# Patient Record
Sex: FEMALE | Race: WHITE | NOT HISPANIC OR LATINO | Employment: UNEMPLOYED | URBAN - METROPOLITAN AREA
[De-identification: names, ages, dates, MRNs, and addresses within clinical notes are randomized per-mention and may not be internally consistent; named-entity substitution may affect disease eponyms.]

---
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
## 2022-01-17 NOTE — PROGRESS NOTES
Progress Note    Patient Name: Sanaz Lange  Date: 8/3/2020           Service Type: Individual      Session Start Time: 11:00am  Session End Time: 11:40am     Session Length: 40 mins     Session #: 13    Attendees: Client attended alone    Service Modality:  Video Visit:    Telemedicine Visit: The patient's condition can be safely assessed and treated via synchronous audio and visual telemedicine encounter.      Reason for Telemedicine Visit: Services only offered telehealth    Originating Site (Patient Location): Patient's home    Distant Site (Provider Location): Provider Remote Setting    Consent:  The patient/guardian has verbally consented to: the potential risks and benefits of telemedicine (video visit) versus in person care; bill my insurance or make self-payment for services provided; and responsibility for payment of non-covered services.     Patient would like the video invitation sent by: Send to e-mail at: [email or web address]}     Mode of Communication:  Video Conference via [employer]    As the provider I attest to compliance with applicable laws and regulations related to telemedicine.     Treatment Plan Last Reviewed: 5/29/2020  PHQ-9 / JUANJOSE-7 : P=19 and G=15    DATA  Interactive Complexity: No  Crisis: No       Progress Since Last Session (Related to Symptoms / Goals / Homework):   Symptoms: Worsening Client is facing transition to college and she is very nervous about it and having a lot of unhelpful thoughts related to this. Client stated she is worried she wont make any friends and wont end up liking her major     Homework: Partially completed      Episode of Care Goals: Satisfactory progress - PREPARATION (Decided to change - considering how); Intervened by negotiating a change plan and determining options / strategies for behavior change, identifying triggers, exploring social supports, and working towards setting a date to begin behavior  A Care Transition RN will be following this patient at discharge for 14 days due to a positive COVID-19 diagnosis.     change     Current / Ongoing Stressors and Concerns:   Transition to college aug 20   Relationship issues with mom      Treatment Objective(s) Addressed in This Session:   use cognitive strategies identified in therapy to challenge anxious thoughts  Identify negative self-talk and behaviors: challenge core beliefs, myths, and actions  identify 4 sleep hygiene practices      Client reported having a lot of unhelpful thoughts recently related to her mother and college transition.     Client reported she has had trouble sleeping due to rumination before bed      Intervention:   CBT: Provider assisted client in fact checking unhelpful thoughts and re-framing  Emotion Focused Therapy: Provider guided client in expressing emotions and processing in session         ASSESSMENT: Current Emotional / Mental Status (status of significant symptoms):   Risk status (Self / Other harm or suicidal ideation)   Patient denies current fears or concerns for personal safety.   Patient reports the following current or recent suicidal ideation or behaviors: Client reported she is NOT feeling suicidal or like wanting to hurt herself but rather that she would like to be able to skip this part of life .   Patient denies current or recent homicidal ideation or behaviors.   Patient denies current or recent self injurious behavior or ideation.   Patient denies other safety concerns.   Patient reports there has been no change in risk factors since their last session.     Patient reports there has been no change in protective factors since their last session.     Recommended that patient call 911 or go to the local ED should there be a change in any of these risk factors.     Appearance:   Appropriate    Eye Contact:   Good    Psychomotor Behavior: Normal    Attitude:   Cooperative    Orientation:   All   Speech    Rate / Production: Normal     Volume:  Normal    Mood:    Anxious  Depressed  Normal   Affect:    Appropriate    Thought  Content:  Rumination    Thought Form:  Coherent    Insight:    Fair      Medication Review:   No changes to current psychiatric medication(s)     Medication Compliance:   Yes     Changes in Health Issues:   None reported     Chemical Use Review:   Substance Use: Chemical use reviewed, no active concerns identified      Tobacco Use: No current tobacco use.      Diagnosis:  1. Moderate episode of recurrent major depressive disorder (H)    2. JUANJOSE (generalized anxiety disorder)        Collateral Reports Completed:   Not Applicable    PLAN: (Patient Tasks / Therapist Tasks / Other)  Provider assigned client to use distractor before bed   Provider assigned client to document fact checking unhelpful thoughts a reminder   Provider assigned client to discuss communication expectations with bf       rosendo Mena, [facility] 8/3/2020                                                                                                       ______________________________________________________________________     Treatment Plan     Patient's Name: Sanaz Lange                    YOB: 2000     Date: 5/29/2020        DSM5 Diagnoses: 296.22 (F32.1)  Major Depressive Disorder, Single Episode, Moderate _ or 300.02 (F41.1) Generalized Anxiety Disorder  Psychosocial / Contextual Factors: school, peers, fears, health         Referral / Collaboration:  Referral to another professional/service: ADHD testing     Anticipated number of session or this episode of care: 8-12        MeasurableTreatment Goal(s) related to diagnosis / functional impairment(s)  Goal 1: Patient will score 9 or less on PHQ9 ( currently 13)     I will know I've met my goal when I reach out to friends more (dance team, [employer]), make new friends (at college).Feeling less afraid of being alone in dark/night       Objective #A (Patient Action)                          Patient will Improve quantity and quality of night time sleep  / decrease daytime naps.  Status: New - Date: 1/7/2020      Intervention(s)  Therapist will teach about sleep hygiene and sleep connection to mental and physical wellbeing.      Objective #B  Patient will Identify negative self-talk and behaviors: challenge core beliefs, myths, and actions.  Status: New - Date: 1/7/2020      Intervention(s)  Therapist will utilize CBT to help client recognize thought distortions and teach client coping skills to help combat them. .     Objective #C  Patient will Improve concentration, focus, and mindfulness in daily activities .  Status: New - Date: 1/7/2020      Intervention(s)  Therapist will provide educational materials on tips for concentration as client has family history of ADHD.Provider will refer client to ADHD testing if provider deems necessary due to functioning issues at school and in personal life.         Goal 2: Patient will score 10 or less on GAD7    I will know I've met my goal when I know how to talk to my friends, make new friends, not crying myself to sleep, not ignoring my feelings. Falling asleep faster.       Objective #A (Patient Action)                          Status: New - Date: 1/7/2020      Patient will identify three distraction and diversion activities and use those activities to decrease level of anxiety  .     Intervention(s)  Therapist will educate client on how distraction can help deviate train of unhelpful thoughts.     Objective #B  Patient will use cognitive strategies identified in therapy to challenge anxious thoughts.                      Status: New - Date: 1/7/2020      Intervention(s)  Therapist will teach emotional regulation skills. This will include use of thought labeling, thought record and relaxation strategies.              Patient has reviewed and agreed to the above plan.        Marilyn Ga, [facility]

## 2022-01-18 ENCOUNTER — VIRTUAL VISIT (OUTPATIENT)
Dept: PSYCHOLOGY | Facility: CLINIC | Age: 22
End: 2022-01-18
Payer: COMMERCIAL

## 2022-01-18 DIAGNOSIS — F41.1 GENERALIZED ANXIETY DISORDER: ICD-10-CM

## 2022-01-18 DIAGNOSIS — F33.1 MAJOR DEPRESSIVE DISORDER, RECURRENT EPISODE, MODERATE (H): Primary | ICD-10-CM

## 2022-01-18 DIAGNOSIS — F90.2 ATTENTION DEFICIT HYPERACTIVITY DISORDER, COMBINED TYPE: ICD-10-CM

## 2022-01-18 PROCEDURE — 90834 PSYTX W PT 45 MINUTES: CPT | Mod: 95 | Performed by: COUNSELOR

## 2022-01-18 ASSESSMENT — ANXIETY QUESTIONNAIRES
GAD7 TOTAL SCORE: 13
GAD7 TOTAL SCORE: 13
6. BECOMING EASILY ANNOYED OR IRRITABLE: MORE THAN HALF THE DAYS
7. FEELING AFRAID AS IF SOMETHING AWFUL MIGHT HAPPEN: SEVERAL DAYS
GAD7 TOTAL SCORE: 13
4. TROUBLE RELAXING: MORE THAN HALF THE DAYS
5. BEING SO RESTLESS THAT IT IS HARD TO SIT STILL: NEARLY EVERY DAY
1. FEELING NERVOUS, ANXIOUS, OR ON EDGE: SEVERAL DAYS
7. FEELING AFRAID AS IF SOMETHING AWFUL MIGHT HAPPEN: SEVERAL DAYS
2. NOT BEING ABLE TO STOP OR CONTROL WORRYING: MORE THAN HALF THE DAYS
3. WORRYING TOO MUCH ABOUT DIFFERENT THINGS: MORE THAN HALF THE DAYS

## 2022-01-18 ASSESSMENT — PATIENT HEALTH QUESTIONNAIRE - PHQ9
10. IF YOU CHECKED OFF ANY PROBLEMS, HOW DIFFICULT HAVE THESE PROBLEMS MADE IT FOR YOU TO DO YOUR WORK, TAKE CARE OF THINGS AT HOME, OR GET ALONG WITH OTHER PEOPLE: SOMEWHAT DIFFICULT
SUM OF ALL RESPONSES TO PHQ QUESTIONS 1-9: 10
SUM OF ALL RESPONSES TO PHQ QUESTIONS 1-9: 10

## 2022-01-18 NOTE — PROGRESS NOTES
Progress Note    Patient Name: Sanaz Lange  Date: 1/18/2022           Service Type: Individual      Session Start Time: 3:00pm  Session End Time: 3:43pm     Session Length: 43 mins     Session #: 4    Attendees: Client attended alone    Service Modality:  Video Visit:      Provider verified identity through the following two step process.  Patient provided:  Patient is known previously to provider    Telemedicine Visit: The patient's condition can be safely assessed and treated via synchronous audio and visual telemedicine encounter.      Reason for Telemedicine Visit: Services only offered telehealth    Originating Site (Patient Location): Patient's other house at [facility]     Distant Site (Provider Location): Provider Remote Setting- Home Office    Consent:  The patient/guardian has verbally consented to: the potential risks and benefits of telemedicine (video visit) versus in person care; bill my insurance or make self-payment for services provided; and responsibility for payment of non-covered services.     Patient would like the video invitation sent by:  My Chart    Mode of Communication:  Video Conference via Amwell    As the provider I attest to compliance with applicable laws and regulations related to telemedicine.     Treatment Plan Last Reviewed: July 2021 (1 session since then)   PHQ-9 / JUANJOSE-7 : 10/13- discussed how symptom report does not match scores (scores have no decreased and symptom report is much less than score indicates)     DATA  Interactive Complexity: No  Crisis: No       Progress Since Last Session (Related to Symptoms / Goals / Homework):   Symptoms: Improving emotions and attention reported stable     Homework: Achieved / completed to satisfaction      Episode of Care Goals: Achieved / completed to satisfaction - MAINTENANCE (Working to maintain change, with risk of relapse); Intervened by continuing to positively reinforce healthy  behavior choice      Current / Ongoing Stressors and Concerns:   Familial relationships, long distance relationships      Treatment Objective(s) Addressed in This Session:   Identify negative self-talk and behaviors: challenge core beliefs, myths, and actions  Boundaries      Intervention:   CBT: reframing, opposite belief   Emotion Focused Therapy: supportive listening, emotional support         ASSESSMENT: Current Emotional / Mental Status (status of significant symptoms):   Risk status (Self / Other harm or suicidal ideation)   Patient denies current fears or concerns for personal safety.   Patient denies current or recent suicidal ideation or behaviors.   Patient denies current or recent homicidal ideation or behaviors.   Patient denies current or recent self injurious behavior or ideation.   Patient denies other safety concerns.   Patient reports there has been no change in risk factors since their last session.     Patient reports there has been no change in protective factors since their last session.     A safety and risk management plan has been developed including:   Patient consented to co-developed safety plan.  Safety and risk management plan was completed.  Patient agreed to use safety plan should any safety concerns arise.  A copy was given to the patient.     Appearance:   Appropriate    Eye Contact:   Good    Psychomotor Behavior: Normal    Attitude:   Cooperative    Orientation:   All   Speech    Rate / Production: Normal     Volume:  Normal    Mood:    Normal   Affect:    Appropriate    Thought Content:  Clear    Thought Form:  Coherent  Logical    Insight:    Good  and Fair      Medication Review:   Changes to psychiatric medications, see updated Medication List in EPIC.      Medication Compliance:   Yes     Changes in Health Issues:   None reported     Chemical Use Review:   Substance Use: Chemical use reviewed, no active concerns identified      Tobacco Use: No current tobacco use.       Diagnosis:  1. Major depressive disorder, recurrent episode, moderate (H)    2. Attention deficit hyperactivity disorder, combined type    3. Generalized anxiety disorder        Collateral Reports Completed:   Not Applicable    PLAN: (Patient Tasks / Therapist Tasks / Other)  Provider assigned client to make a list of reminders why she loves college for when she is feeling down when leaving her partner       Review goals-maintanence plan   MANUEL Fox 1/18/2022                                                           ______________________________________________________________________     Treatment Plan     Patient's Name: Sanaz Lange                 YOB: 2000     Date: 7/29/2021        DSM5 Diagnoses:   1. Generalized anxiety disorder    2. Major depressive disorder, recurrent episode, moderate (H)    3. Attention deficit hyperactivity disorder, combined type       Psychosocial / Contextual Factors: college, adhd,         Referral / Collaboration:  Referral to another professional/service is not indicated at this time.     Anticipated number of session or this episode of care: 8        MeasurableTreatment Goal(s) related to diagnosis / functional impairment(s)  Goal 1: Patient will     I will know I've met my goal when I wake up and go places on time (currently 5-10 minutes behind). I feel like I cannot explain how I feel so I would like to be able to do that.             Patient has reviewed and agreed to the above plan.        MANUEL Fox                       July 29, 2021            Answers for HPI/ROS submitted by the patient on 1/18/2022  If you checked off any problems, how difficult have these problems made it for you to do your work, take care of things at home, or get along with other people?: Somewhat difficult  PHQ9 TOTAL SCORE: 10  JUANJOSE 7 TOTAL SCORE: 13

## 2022-01-19 ASSESSMENT — PATIENT HEALTH QUESTIONNAIRE - PHQ9: SUM OF ALL RESPONSES TO PHQ QUESTIONS 1-9: 10

## 2022-01-19 ASSESSMENT — ANXIETY QUESTIONNAIRES: GAD7 TOTAL SCORE: 13

## 2022-02-02 ENCOUNTER — VIRTUAL VISIT (OUTPATIENT)
Dept: PSYCHOLOGY | Facility: CLINIC | Age: 22
End: 2022-02-02
Payer: COMMERCIAL

## 2022-02-02 DIAGNOSIS — F90.2 ATTENTION DEFICIT HYPERACTIVITY DISORDER, COMBINED TYPE: ICD-10-CM

## 2022-02-02 DIAGNOSIS — F41.1 GENERALIZED ANXIETY DISORDER: ICD-10-CM

## 2022-02-02 DIAGNOSIS — F33.1 MAJOR DEPRESSIVE DISORDER, RECURRENT EPISODE, MODERATE (H): Primary | ICD-10-CM

## 2022-02-02 PROCEDURE — 90834 PSYTX W PT 45 MINUTES: CPT | Mod: 95 | Performed by: COUNSELOR

## 2022-02-02 NOTE — PROGRESS NOTES
Progress Note    Patient Name: Sanaz Lange  Date: 2/2/2022           Service Type: Individual      Session Start Time: 7:05am  Session End Time: 4:47am     Session Length: 42 mins     Session #: 5    Attendees: Client attended alone    Service Modality:  Video Visit:      Provider verified identity through the following two step process.  Patient provided:  Patient is known previously to provider    Telemedicine Visit: The patient's condition can be safely assessed and treated via synchronous audio and visual telemedicine encounter.      Reason for Telemedicine Visit: Services only offered telehealth    Originating Site (Patient Location): Patient's home    Distant Site (Provider Location): Provider Remote Setting- Home Office    Consent:  The patient/guardian has verbally consented to: the potential risks and benefits of telemedicine (video visit) versus in person care; bill my insurance or make self-payment for services provided; and responsibility for payment of non-covered services.     Patient would like the video invitation sent by:  My Chart    Mode of Communication:  Video Conference via Amwell    As the provider I attest to compliance with applicable laws and regulations related to telemedicine.         DATA  Interactive Complexity: No  Crisis: No       Progress Since Last Session (Related to Symptoms / Goals / Homework):   Symptoms: Worsening felt very overwhelmed due to school had emotional breakdown     Homework: Partially completed      Episode of Care Goals: Minimal progress - ACTION (Actively working towards change); Intervened by reinforcing change plan / affirming steps taken     Current / Ongoing Stressors and Concerns:   Schedule/credits too much      Treatment Objective(s) Addressed in This Session:   use cognitive strategies identified in therapy to challenge anxious thoughts  Identify negative self-talk and behaviors: challenge core beliefs,  myths, and actions  Improve concentration, focus, and mindfulness in daily activities        Intervention:   Emotion Focused Therapy: supportive listening, validation, emotional processing  Solution Focused: mood documentation  for psych and therapy        ASSESSMENT: Current Emotional / Mental Status (status of significant symptoms):       Risk status (Self / Other harm or suicidal ideation)              Patient denies current fears or concerns for personal safety.              Patient denies current or recent suicidal ideation or behaviors.              Patient denies current or recent homicidal ideation or behaviors.              Patient denies current or recent self injurious behavior or ideation.              Patient denies other safety concerns.              Patient reports there has been no change in risk factors since their last session.                Patient reports there has been no change in protective factors since their last session.                A safety and risk management plan has been developed including:   Patient consented to co-developed safety plan.  Safety and risk management plan was completed.  Patient agreed to use safety plan should any safety concerns arise.  A copy was given to the patient.       Appearance:   Appropriate    Eye Contact:   Good    Psychomotor Behavior: Normal    Attitude:   Cooperative    Orientation:   All   Speech    Rate / Production: Normal     Volume:  Normal    Mood:    Normal   Affect:    Appropriate    Thought Content:  Clear  Rumination    Thought Form:  Coherent  Logical    Insight:    Fair      Medication Review:   No changes to current psychiatric medication(s)     Medication Compliance:   Yes     Changes in Health Issues:   None reported     Chemical Use Review:   Substance Use: Chemical use reviewed, no active concerns identified      Tobacco Use: No current tobacco use.      Diagnosis:  1. Major depressive disorder, recurrent episode, moderate (H)    2.  Attention deficit hyperactivity disorder, combined type    3. Generalized anxiety disorder        Collateral Reports Completed:   Not Applicable    PLAN: (Patient Tasks / Therapist Tasks / Other)  Provider assigned client to journal and document mood       Goals   MANUEL Fox 2/2/2022                                                           ______________________________________________________________________     Treatment Plan     Patient's Name: Sanaz Lange                 YOB: 2000     Date: 7/29/2021        DSM5 Diagnoses:   1. Generalized anxiety disorder    2. Major depressive disorder, recurrent episode, moderate (H)    3. Attention deficit hyperactivity disorder, combined type       Psychosocial / Contextual Factors: college, adhd,         Referral / Collaboration:  Referral to another professional/service is not indicated at this time.     Anticipated number of session or this episode of care: 8        MeasurableTreatment Goal(s) related to diagnosis / functional impairment(s)  Goal 1: Patient will     I will know I've met my goal when I wake up and go places on time (currently 5-10 minutes behind). I feel like I cannot explain how I feel so I would like to be able to do that.             Patient has reviewed and agreed to the above plan.        MANUEL Fox                       July 29, 2021              Answers for HPI/ROS submitted by the patient on 1/18/2022  If you checked off any problems, how difficult have these problems made it for you to do your work, take care of things at home, or get along with other people?: Somewhat difficult  PHQ9 TOTAL SCORE: 10  JUANJOSE 7 TOTAL SCORE: 13

## 2022-03-02 ENCOUNTER — VIRTUAL VISIT (OUTPATIENT)
Dept: PSYCHOLOGY | Facility: CLINIC | Age: 22
End: 2022-03-02
Payer: COMMERCIAL

## 2022-03-02 DIAGNOSIS — F90.2 ATTENTION DEFICIT HYPERACTIVITY DISORDER, COMBINED TYPE: ICD-10-CM

## 2022-03-02 DIAGNOSIS — F33.1 MAJOR DEPRESSIVE DISORDER, RECURRENT EPISODE, MODERATE (H): Primary | ICD-10-CM

## 2022-03-02 DIAGNOSIS — F41.1 GENERALIZED ANXIETY DISORDER: ICD-10-CM

## 2022-03-02 PROCEDURE — 90834 PSYTX W PT 45 MINUTES: CPT | Mod: 95 | Performed by: COUNSELOR

## 2022-03-02 NOTE — PROGRESS NOTES
"    [facility]                                     Progress Note    Patient Name: Sanaz Lange  Date: 3/2/2022           Service Type: Individual      Session Start Time: 1:03pm  Session End Time: 1:42pm     Session Length: 39 mins     Session #: 6    Attendees: Client attended alone    Service Modality:  Video Visit:      Provider verified identity through the following two step process.  Patient provided:  Patient is known previously to provider    Telemedicine Visit: The patient's condition can be safely assessed and treated via synchronous audio and visual telemedicine encounter.      Reason for Telemedicine Visit: Services only offered telehealth    Originating Site (Patient Location): Patient's home    Distant Site (Provider Location): Provider Remote Setting- Home Office    Consent:  The patient/guardian has verbally consented to: the potential risks and benefits of telemedicine (video visit) versus in person care; bill my insurance or make self-payment for services provided; and responsibility for payment of non-covered services.     Patient would like the video invitation sent by:  My Chart    Mode of Communication:  Video Conference via Amwell    As the provider I attest to compliance with applicable laws and regulations related to telemedicine.    DATA  Interactive Complexity: No  Crisis: No        Progress Since Last Session (Related to Symptoms / Goals / Homework):   Symptoms: Worsening client reported not taking mental health medications and her mood being off, \"feeling like I am falling apart\"    Homework: Did not complete      Episode of Care Goals: Minimal progress - ACTION (Actively working towards change); Intervened by reinforcing change plan / affirming steps taken     Current / Ongoing Stressors and Concerns:   Remembering medication   Reading assignments   Keeping up with online class      Treatment Objective(s) Addressed in This Session:   Discuss motivation / " ambivalence about taking anti-depressant / mood stabilizer medication(s)       Intervention:   Emotion Focused Therapy: emotional processing, supportive listening  Solution Focused: plan for med compliance    Assessments completed prior to visit:  The following assessments were completed by patient for this visit:  PROMIS 10-Global Health (only subscores and total score):   PROMIS-10 Scores Only 1/18/2022   Global Mental Health Score 12   Global Physical Health Score 17   PROMIS TOTAL - SUBSCORES 29         ASSESSMENT: Current Emotional / Mental Status (status of significant symptoms):   Risk status (Self / Other harm or suicidal ideation)   Patient denies current fears or concerns for personal safety.   Patient denies current or recent suicidal ideation or behaviors.   Patient denies current or recent homicidal ideation or behaviors.   Patient denies current or recent self injurious behavior or ideation.   Patient denies other safety concerns.   Patient reports there has been no change in risk factors since their last session.     Patient reports there has been no change in protective factors since their last session.     A safety and risk management plan has been developed including: Patient has no change in safety concerns. Committed to safety and agreed to follow previously developed safety plan..  Patient consented to co-developed safety plan.  Safety and risk management plan was completed.  Patient agreed to use safety plan should any safety concerns arise.  A copy was given to the patient.     Appearance:   Appropriate    Eye Contact:   Good    Psychomotor Behavior: Normal    Attitude:   Cooperative  Interested   Orientation:   All   Speech    Rate / Production: Emotional    Volume:  Normal    Mood:    Anxious  Depressed    Affect:    Tearful   Thought Content:  Rumination    Thought Form:  Coherent    Insight:    Fair      Medication Review:   No changes to current psychiatric medication(s)     Medication  Compliance:   No client reports taking meds only 2 days a week      Changes in Health Issues:   None reported     Chemical Use Review:   Substance Use: Chemical use reviewed, no active concerns identified      Tobacco Use: No current tobacco use.      Diagnosis:  1. Major depressive disorder, recurrent episode, moderate (H)    2. Attention deficit hyperactivity disorder, combined type    3. Generalized anxiety disorder        Collateral Reports Completed:   Not Applicable    PLAN: (Patient Tasks / Therapist Tasks / Other)  Provider assigned client to make psych appt for med eval  Provider assigned client to use reminders to help with med intake       goals  MANUEL Fox 3/2/2022                                                           ______________________________________________________________________    Individual Treatment Plan    Patient's Name: Sanaz Lange  YOB: 2000    Date of Creation: 3/2/2022    Date Treatment Plan Last Reviewed/Revised: 3/2/2022      DSM5 Diagnoses:   1. Major depressive disorder, recurrent episode, moderate (H)    2. Attention deficit hyperactivity disorder, combined type    3. Generalized anxiety disorder      Psychosocial / Contextual Factors: reading, emotional regulation  PROMIS (reviewed every 90 days): 29    Referral / Collaboration:  Referral to another professional/service is not indicated at this time..    Anticipated number of session for this episode of care: 4 (this provider going on leave)  Anticipation frequency of session: Every other week  Anticipated Duration of each session: 38-52 minutes  Treatment plan will be reviewed in 90 days or when goals have been changed.       MeasurableTreatment Goal(s) related to diagnosis / functional impairment(s)  Goal 1: Patient will take medications 100% of the time     I will know I've met my goal when .          MANUEL Fox  March 2, 2022  Answers for HPI/ROS submitted by the patient on  3/2/2022  If you checked off any problems, how difficult have these problems made it for you to do your work, take care of things at home, or get along with other people?: Somewhat difficult  PHQ9 TOTAL SCORE: 10

## 2022-03-03 ASSESSMENT — PATIENT HEALTH QUESTIONNAIRE - PHQ9: SUM OF ALL RESPONSES TO PHQ QUESTIONS 1-9: 10

## 2022-03-05 ENCOUNTER — HEALTH MAINTENANCE LETTER (OUTPATIENT)
Age: 22
End: 2022-03-05

## 2022-03-08 NOTE — PROGRESS NOTES
"[facility] behavioral health [facility]  March 9, 2022      Behavioral Health Clinician Progress Note    Patient Name: Sanaz Lange           Service Type:  Individual      Service Location:   [employer] / Email (patient reached)     Session Start Time: 1100am  Session End Time: 1116am      Session Length: 16 - 37      Attendees: Patient    Visit Activities (Refresh list every visit): [facility] Only    Diagnostic Assessment Date: 10/09/2021  Treatment Plan Review Date: 10/09/2022  See Flowsheets for today's PHQ-9 and JUANJOSE-7 results  Previous PHQ-9:   PHQ-9 SCORE 10/27/2021 1/18/2022 3/2/2022   PHQ-9 Total Score MyChart - 10 (Moderate depression) 10 (Moderate depression)   PHQ-9 Total Score 12 10 10   PHQ-A Total Score - - -     Previous JUANJOSE-7:   JUANJOSE-7 SCORE 10/7/2021 10/27/2021 1/18/2022   Total Score 16 (severe anxiety) - 13 (moderate anxiety)   Total Score 16 12 13       DEYSI LEVEL:  No flowsheet data found.    DATA  Extended Session (60+ minutes): No  Interactive Complexity: No  Crisis: No  [facility] Patient: No    Treatment Objective(s) Addressed in This Session:  Target Behavior(s): improve med compliance    Depressed Mood: Decrease frequency and intensity of feeling down, depressed, hopeless    Current Stressors / Issues:  MH update: Sanaz reports that attention has not improved no matter what strategies she's tried. Anxiety and depression have been manageable as long as she takes meds (states that she had forgotten a few doses and noticed an increase in her symptoms) Depression and anxiety getting worse: feelings of helplessness, sad, academic underperformance, low self worth.   Stresses: No  Appetite: unremarkable  Sleep: unremarkable  Therapy:  Yes with [facility]  EVE: Occassionally drinks etoh  Preg: No  Interventions: explored ways to maintain med compliance by using phone reminder, CBT to challenge cognitive distortions (\"not good enough\")  Most important: attention    Progress on Treatment " Objective(s) / Homework:  Satisfactory progress - ACTION (Actively working towards change); Intervened by reinforcing change plan / affirming steps taken    Motivational Interviewing    MI Intervention: Co-Developed Goal: maintain med compliance, Expressed Empathy/Understanding, Open-ended questions, Reflections: simple and complex and Change talk (evoked)     Change Talk Expressed by the Patient: Desire to change Ability to change Reasons to change Need to change Committment to change Activation Taking steps    Provider Response to Change Talk: E - Evoked more info from patient about behavior change, A - Affirmed patient's thoughts, decisions, or attempts at behavior change, R - Reflected patient's change talk and S - Summarized patient's change talk statements      Care Plan review completed: Yes    Medication Review:  No changes to current psychiatric medication(s)    Medication Compliance:  Yes    Changes in Health Issues:   None reported    Chemical Use Review:   Substance Use: Chemical use reviewed, no active concerns identified      Tobacco Use: No current tobacco use.      Assessment: Current Emotional / Mental Status (status of significant symptoms):  Risk status (Self / Other harm or suicidal ideation)  Patient denies a history of suicidal ideation, suicide attempts, self-injurious behavior, homicidal ideation, homicidal behavior and and other safety concerns  Patient denies current fears or concerns for personal safety.  Patient denies current or recent suicidal ideation or behaviors.  Patient denies current or recent homicidal ideation or behaviors.  Patient denies current or recent self injurious behavior or ideation.  Patient denies other safety concerns.  A safety and risk management plan has not been developed at this time, however patient was encouraged to call [facility] should there be a change in any of these risk factors.    Appearance:   Appropriate   Eye Contact:   Good   Psychomotor  Behavior: Normal   Attitude:   Cooperative   Orientation:   All  Speech   Rate / Production: Normal    Volume:  Normal   Mood:    Depressed   Affect:    Appropriate   Thought Content:  Clear   Thought Form:  Coherent  Logical   Insight:    Good     Diagnoses:  1. Depression, major, recurrent, mild (H)    2. Attention deficit hyperactivity disorder (ADHD), combined type    3. JUANJOSE (generalized anxiety disorder)        Collateral Reports Completed:  Collaborated with Usha Hobbs, MSN, APRN, CNP, [facility], [facility]    Plan: (Homework, other):  Patient was given information about behavioral services and encouraged to schedule a follow up appointment with the clinic [facility] in 1 month.  She was also given information about mental health symptoms and treatment options .  CD Recommendations: No indications of CD issues.  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    Anticipated number of session for this episode of care: 4-6  Anticipation frequency of session: Monthly  Anticipated Duration of each session: 16-37 minutes  Treatment plan will be reviewed in 90 days or when goals have been changed.       MeasurableTreatment Goal(s) related to diagnosis / functional impairment(s)  Goal 1: Patient will experience  improved depression    I will know I've met my goal when I have less feelings of helplessness and hopelessness.      Objective #A (Patient Action)    Patient will Decrease frequency and intensity of feeling down, depressed, hopeless.  Status: Continued - Date(s): 06/08/2022    Intervention(s)  Therapist will teach strategies to challenge destructive self talk.    Patient has reviewed and agreed to the above plan.      Saida Rodriguez, [facility]  March 9, 2022

## 2022-03-09 ENCOUNTER — VIRTUAL VISIT (OUTPATIENT)
Dept: BEHAVIORAL HEALTH | Facility: CLINIC | Age: 22
End: 2022-03-09
Payer: COMMERCIAL

## 2022-03-09 ENCOUNTER — VIRTUAL VISIT (OUTPATIENT)
Dept: PSYCHIATRY | Facility: CLINIC | Age: 22
End: 2022-03-09
Payer: COMMERCIAL

## 2022-03-09 DIAGNOSIS — F32.0 MILD MAJOR DEPRESSION (H): ICD-10-CM

## 2022-03-09 DIAGNOSIS — F41.1 GAD (GENERALIZED ANXIETY DISORDER): ICD-10-CM

## 2022-03-09 DIAGNOSIS — F33.0 DEPRESSION, MAJOR, RECURRENT, MILD (H): Primary | ICD-10-CM

## 2022-03-09 DIAGNOSIS — F41.9 ANXIETY: ICD-10-CM

## 2022-03-09 DIAGNOSIS — F98.8 ATTENTION DEFICIT DISORDER, UNSPECIFIED HYPERACTIVITY PRESENCE: Primary | ICD-10-CM

## 2022-03-09 DIAGNOSIS — F90.2 ATTENTION DEFICIT HYPERACTIVITY DISORDER (ADHD), COMBINED TYPE: ICD-10-CM

## 2022-03-09 PROCEDURE — 90832 PSYTX W PT 30 MINUTES: CPT | Mod: 95 | Performed by: SOCIAL WORKER

## 2022-03-09 PROCEDURE — 99214 OFFICE O/P EST MOD 30 MIN: CPT | Mod: 95 | Performed by: NURSE PRACTITIONER

## 2022-03-09 RX ORDER — VENLAFAXINE HYDROCHLORIDE 150 MG/1
150 CAPSULE, EXTENDED RELEASE ORAL DAILY
Qty: 90 CAPSULE | Refills: 0 | Status: SHIPPED | OUTPATIENT
Start: 2022-03-09 | End: 2022-05-10

## 2022-03-09 RX ORDER — METHYLPHENIDATE HYDROCHLORIDE 20 MG/1
20 TABLET ORAL 2 TIMES DAILY
Qty: 60 TABLET | Refills: 0 | Status: SHIPPED | OUTPATIENT
Start: 2022-03-09 | End: 2022-05-10

## 2022-03-09 RX ORDER — LAMOTRIGINE 25 MG/1
25 TABLET ORAL DAILY
Qty: 180 TABLET | Refills: 1 | Status: SHIPPED | OUTPATIENT
Start: 2022-03-09 | End: 2022-04-01

## 2022-03-09 RX ORDER — HYDROXYZINE HYDROCHLORIDE 25 MG/1
25 TABLET, FILM COATED ORAL 2 TIMES DAILY PRN
Qty: 30 TABLET | Refills: 0 | Status: SHIPPED | OUTPATIENT
Start: 2022-03-09 | End: 2023-01-11

## 2022-03-09 NOTE — PROGRESS NOTES
PSYCHIATRIC MEDICATION FOLLOW UP APPT     Name:  Sanaz Lange  : 2000    Sanaz Lange is a 21 year old female who is being evaluated via a billable video visit.      How would you like to obtain your AVS? MyChart  If the video visit is dropped, the invitation should be resent by: Text to cell phone: [phone]  Will anyone else be joining your video visit? No    [place] If not [place] address below, please ask where they are in case of an emergency situation arises during the appointment.  [id number] [facility] [address]    Telemedicine Visit: The patient's condition can be safely assessed and treated via synchronous audio and visual telemedicine encounter.      Reason for Telemedicine Visit: COVID 19 pandemic and the social and physical recommendations by the CDC and MDH.      Originating Site (Patient Location): Patient's home    Distant Site (Provider Location): Provider Remote Setting    Consent:  The patient/guardian has verbally consented to: the potential risks and benefits of telemedicine (video visit or phone) versus in person care; bill my insurance or make self-payment for services provided; and responsibility for payment of non-covered services.     Mode of Communication:  GOWEX video platform     As the provider I attest to compliance with applicable laws and regulations related to telemedicine.    IDENTIFICATION   Referred by: Blessing Wadsworth PA-C [facility]   Therapist: Marilyn with [facility]     Patient attended the phone/video session alone.    Last seen for outpatient psychiatry Return Visit on 2021.      FOLLOWING PLAN PUT INTO PLACE: No improvement in depression and anxiety with the Venlafaxine increase to 187.5 mg. Will decrease back to the 150 mg and augment with lamotrigine titrated to 50 mg daily targeting residual depression. Follow-up two months       INTERIM HISTORY      COMMUNICATIONS FROM PATIENT VIA:  none    RECORDS AVAILABLE FOR REVIEW: EHR records through [employer] .  In addition, reviewed the assessment completed by Saida Rodriguez [facility], dated today       HISTORY OF PRESENT ILLNESS   [facility]PS referral for psychiatric medication consult in 10/07/2021.  Reports history of depression and ADHD.  Denies prior psychiatric hospitalizations. No history of suicidal thoughts or attempts. No history of self-injurious behaviors. Genetically loaded for  anxiety, depression and ADHD.. Grew up in an intact home with all basic needs being met.     Reports past diagnosis of depression and ADHD. First sought treatment in around 2018 in the context of depression.  Felt sad all the time, emotional, felt she was letting people down, couldn't focus during class.  She was started with therapy and anitdepressants.  Subsequently diagnosis with ADHD and when looking back at elementary, dyllan and high school years endorses long standing distractibility, restlessness, poor concentration, inattention, forgetfulness, need for repetition of instructions, and poor listening.       FAMILY, MEDICAL, SURGICAL HISTORY REVIEWED.  MEDICATION HAVE BEEN REVIEWED AND ARE CURRENT TO THE BEST OF MY KNOWLEDGE AND ABILITY.  Student at [employer], Sophomore  Living with a roommate  Working part time as a       MEDICATIONS                                                                                                Current Outpatient Medications   Medication Sig     Cetirizine HCl (ZYRTEC PO)      cholecalciferol (VITAMIN D3) 125 MCG (5000 UT) TABS tablet Take 1 tablet (5,000 Units) by mouth daily     hydrOXYzine (ATARAX) 25 MG tablet Take 1 tablet (25 mg) by mouth 2 times daily as needed for anxiety (or panic)     lamoTRIgine (LAMICTAL) 25 MG tablet Take 1 tablet (25 mg) by mouth daily take 25 mg daily for two weeks then increase to 50 mg daily until follow-up appointment in four weeks.      "methylphenidate (RITALIN LA) 20 MG 24 hr capsule Take 20 mg by mouth daily     methylphenidate (RITALIN LA) 20 MG 24 hr capsule Take 20 mg by mouth daily     norgestim-eth estrad triphasic (TRI-LO-SPRINTEC) 0.18/0.215/0.25 MG-25 MCG tablet Take 1 tablet by mouth daily     venlafaxine (EFFEXOR-XR) 150 MG 24 hr capsule Take 1 capsule (150 mg) by mouth daily     venlafaxine (EFFEXOR-XR) 37.5 MG 24 hr capsule Take 1 capsule (37.5 mg) by mouth daily Take with 150 mg for total daily dose of 187.5 mg     No current facility-administered medications for this visit.        NOTES ABOUT CURRENT PSYCHOTROPIC MEDICATIONS:   Ritalin 20 LA mg   Venlafaxine  mg, no benefit from increase to 187.5 mg in 11/2021  Hydroxyzine 25 mg twice a day as needed anxiety, taking it 2 times at school  Lamotrigine 50 mg       Supplements: vitamin D. Omega Fish oil/omega-3, MV     PAST PSYCHOTROPIC MEDICATIONS:  Fluoxetine   Citalopram       TODAY PATIENT REPORTS THE FOLLOWING PSYCHIATRIC ROS:   Per [facility], Saida Rodriguez, during today's team-based visit:  \" update: Sanaz reports that attention has not improved no matter what strategies she's tried. Anxiety and depression have been manageable as long as she takes meds (states that she had forgotten a few doses and noticed an increase in her symptoms) Depression and anxiety getting worse: feelings of helplessness, sad, academic underperformance, low self worth.  Stresses: No  Appetite: unremarkable  Sleep: unremarkable  Therapy: Yes with Marilyn Ga [facility]  EVE: Occassionally drinks etoh  Preg: No  Interventions: explored ways to maintain med compliance by using phone reminder, CBT to challenge cognitive distortions (\"not good enough\")  Most important: attention\"     EXERCISE: Adequate  SIDE EFFECTS:   tolerating medications without reported side effects  SUBSTANCE USE:  Denies   COMPLIANCE:   states Adherent to medication regimen  REPORTS THE FOLLOWING NEW MEDICAL ISSUES:   " none    PROBLEM: DEPRESSION: Feels the current medication regimen is effective.    Suicidal ideation: Denies       Last PHQ-9 3/2/2022   1.  Little interest or pleasure in doing things 2   2.  Feeling down, depressed, or hopeless 2   3.  Trouble falling or staying asleep, or sleeping too much 1   4.  Feeling tired or having little energy 1   5.  Poor appetite or overeating 1   6.  Feeling bad about yourself 1   7.  Trouble concentrating 2   8.  Moving slowly or restless 0   Q9: Thoughts of better off dead/self-harm past 2 weeks 0   PHQ-9 Total Score 10   Difficulty at work, home, or with people -   In the past two weeks have you had thoughts of suicide or self harm? -   Do you have concerns about your personal safety or the safety of others? -     PHQ-9 SCORE 10/27/2021 1/18/2022 3/2/2022   PHQ-9 Total Score MyChart - 10 (Moderate depression) 10 (Moderate depression)   PHQ-9 Total Score 12 10 10   PHQ-A Total Score - - -     PHQ9 score is Not completed today  Suicidal ideation:  No     PROBLEM: ANXIETY: Feels the current medication regimen is effective..   GAD7 score is Not completed today  JUANJOSE-7 SCORE 10/7/2021 10/27/2021 1/18/2022   Total Score 16 (severe anxiety) - 13 (moderate anxiety)   Total Score 16 12 13       PROBLEM: ADHD: Worsening.  Currently on Spring Break from classes.  Had to drop one class due to getting behind.  She only needs the methylphenidate for studying and classes.  Sometimes it is too late in the day to take it.      PERTINENT PAST MEDICAL AND SURGICAL HISTORY     Past Medical History:   Diagnosis Date     Hypovitaminosis D 9/19/2019     Other specified viral warts 4/2/2015       VITALS     BP Readings from Last 1 Encounters:   07/07/21 110/70     Pulse Readings from Last 1 Encounters:   07/07/21 100     Wt Readings from Last 1 Encounters:   07/07/21 78 kg (172 lb) (93 %, Z= 1.46)*     * Growth percentiles are based on CDC (Girls, 2-20 Years) data.     Ht Readings from Last 1 Encounters:  "  07/07/21 1.638 m (5' 4.5\") (53 %, Z= 0.09)*     * Growth percentiles are based on [facility] (Girls, 2-20 Years) data.     Estimated body mass index is 29.07 kg/m  as calculated from the following:    Height as of 7/7/21: 1.638 m (5' 4.5\").    Weight as of 7/7/21: 78 kg (172 lb).    LABS & IMAGING                                                                                                                   Recent Labs   Lab Test 03/18/21  1532 06/04/19  1134   WBC 7.3 6.9   HGB 12.6 11.8   HCT 38.5 36.2   MCV 90 84    281   ANEU  --  3.7     Recent Labs   Lab Test 03/18/21  1532      POTASSIUM 4.2   CHLORIDE 107   CO2 27   *   RENUKA 9.3   BUN 9   CR 0.80   GFRESTIMATED >90   ALBUMIN 3.9   PROTTOTAL 8.1   AST 24   ALT 28   ALKPHOS 49   BILITOTAL 0.1*     No lab results found.  Recent Labs   Lab Test 07/07/21  0919   TSH 1.37   T4 0.86        ALLERGY & IMMUNIZATIONS     No Known Allergies    MEDICAL REVIEW OF SYSTEMS:   Ten system review was completed with pertinent positives noted     MENTAL STATUS EXAM:   General/Constitutional:  Appearance:   awake, alert, adequately groomed, appeared stated age and no apparent distress  Attitude:    cooperative   Eye Contact:  good  Musculoskeletal:  Psychomotor Behavior:  no evidence of tardive dyskinesia, dystonia, or tics from the head up  Psychiatric:  Speech:  clear, coherent, regular rate, rhythm, and volume,   No pressure speech noted.  Associations:  no loose associations  Thought Process:   logical, linear and goal oriented  Thought Content:    No evidence of suicidal ideation or homicidal ideation, no evidence of psychotic thought, no auditory hallucinations present and no visual hallucinations present  Mood:  improving   Affect:  full range/stable (normal variation of emotions during exam) and was congruent to speech content.  Insight:  good  Judgment:  intact, adequate for safety  Impulse Control:  intact  Neurological:  Oriented to:  person, place, " time, and situation  Attention Span and Concentration:  normal    Language: intact     Recent and Remote Memory:  Intact to interview. Not formally assessed. No amnesia.    Fund of Knowledge: appropriate        SAFETY   Feels safe in home: Yes   Suicidal ideation: Denies  History of suicide attempts:  No   Hx of impulsivity: No   Hope for the future: present    Hx of Command hallucinations or current psychosis: No  History of Self-injurious behaviors: No Current:  No  Family member  by suicide:  No     SAFETY ASSESSMENT:   Based on all available evidence including the factors cited above, overall Risk for harm is low and is appropriate for outpatient level of care.   Recommended that patient call 911 or go to the local ED should there be a change in any of these risk factors.    DSM 5 DIAGNOSIS:   Attention-Deficit/Hyperactivity Disorder  314.00 (F90.0) Predominantly inattentive presentation  296.31 (F33.0) Major Depressive Disorder, Recurrent Episode, Mild _    MEDICAL COMORBIDITY IMPACTING CLINICAL PICTURE: None noted  ASSESSMENT AND PLAN      Problem List Items Addressed This Visit        Behavioral     Mood is essentially stable on current medication regimen.  Primary symptom is executive functioning impairment (inability to focus, impaired organization and planning, reduced working memory). The methylphenidate LA 20 mg is not as effective as it has been.  In addition, she only needs for studying and classes.  Therefore will transition to methylphenidate IR and increase to 20 mg twice a day or she can just time taking one tablet if she has a 4 hour window for studying and not take the second tablet.  Continue remaining medications at current dosages. Follow-up two months          Attention deficit disorder, unspecified hyperactivity presence - Primary    Relevant Medications    methylphenidate (RITALIN) 20 MG tablet    Mild major depression (H)    Relevant Medications    venlafaxine (EFFEXOR-XR) 150 MG 24 hr  capsule    hydrOXYzine (ATARAX) 25 MG tablet    lamoTRIgine (LAMICTAL) 25 MG tablet       Other    Anxiety    Relevant Medications    venlafaxine (EFFEXOR-XR) 150 MG 24 hr capsule    hydrOXYzine (ATARAX) 25 MG tablet    lamoTRIgine (LAMICTAL) 25 MG tablet           CONSULTS/REFERRALS:   Continue therapy   Coordinate care with therapist as needed     MEDICAL:   None at this time  Coordinate care with PCP (Blessing Wadsworth) as needed  Follow up with primary care provider as planned or for acute medical concerns.     PSYCHOEDUCATION:  Medication side effects and alternatives reviewed. Health promotion activities recommended and reviewed today. All questions addressed. Education and counseling completed regarding risks and benefits of medications and psychotherapy options.  Consent provided by patient/guardian  Call the psychiatric nurse line with medication questions or concerns at [phone].  VILOOPhart may be used to communicate with your provider, but this is not intended to be used for emergencies.  BLACK BOX WARNING: Discussed the Food and Drug Administration (FDA) requires that all antidepressants carry a warning that some children, adolescents and young adults may be at increased risk of suicide when taking antidepressants. Anyone taking an antidepressant should be watched closely for worsening depression or unusual behavior especially in the first few weeks after starting an SSRI. Keep in mind, antidepressants are more likely to reduce suicide risk in the long run by improving mood.   STIMULANT THERAPY: Side effects discussed including but not limited to cardiac (including HTN, tachycardia, sudden death), motor/tic, appetite/growth, mood lability and sleep disruption. This is a controlled substance with risk for abuse, need to keep in a safe keep place and cannot replace lost scripts  Medlineplus.gov is information for patients.  It is run by the [employer] and it contains information about  all disorders, diseases and all medications.       COMMUNITY RESOURCES:    CRISIS NUMBERS: Provided in AVS 10/7/2021  National Suicide Prevention Lifeline: 7-809-843-TALK (666-858-5682)  AisleFinder/resources for a list of additional resources (SOS)            [facility] - [phone]   Urgent Care Adult Mental Rtfkvw-[phone] mobile unit/ 24/7 crisis line  [facility] -[phone]   COPE 24/7 [facility] Mobile Team -[phone] (adults)/ [phone] (child)  Poison Control Center - 1-556.869.7689    OR  go to nearest ER  Crisis Text Line for any crisis 24/7 send this-   To: 537946   [facility] ([facility]) [facility]  [phone]  National Suicide Prevention Lifeline: 868.813.5427 (TTY: 103.879.1521). Call anytime for help.  (www.suicidepreventionlifeline.org)  National [place] Mental Illness (www.sallie.org): 583-123-4186 or 445-646-9964.   Mental Health Association (www.mentalhealth.org): 710.982.4573 or 384-117-0701.  [place] Crisis Text Line: Text [place] to [address]0001  Suicide LifeLine Chat: suicideTapestryline.org/chat      ADMINISTRATIVE BILLING:     Time spent interviewing patient, reviewing referral documents, obtaining and reviewing outside records, communication with other health specialists, and preparing this report.    Video/Phone Start Time:  11:15 AM  Video/Phone End Time:  1132    Greater than 50% of time was spent in counseling and coordination of care regarding above diagnoses and treatment plan.    Patient Status:  Patient will continue to be seen for ongoing consultation and stabilization.    Signed:   Usha Hobbs, MSN, APRN, FMHNP-[facility] ([facility])   Chart documentation done in part with Dragon Voice Recognition software.  Although reviewed after completion, some word and grammatical errors may remain.

## 2022-03-24 ENCOUNTER — VIRTUAL VISIT (OUTPATIENT)
Dept: PSYCHOLOGY | Facility: CLINIC | Age: 22
End: 2022-03-24
Payer: COMMERCIAL

## 2022-03-24 DIAGNOSIS — F41.1 GENERALIZED ANXIETY DISORDER: ICD-10-CM

## 2022-03-24 DIAGNOSIS — F90.2 ATTENTION DEFICIT HYPERACTIVITY DISORDER, COMBINED TYPE: ICD-10-CM

## 2022-03-24 DIAGNOSIS — F33.1 MAJOR DEPRESSIVE DISORDER, RECURRENT EPISODE, MODERATE (H): Primary | ICD-10-CM

## 2022-03-24 PROCEDURE — 90834 PSYTX W PT 45 MINUTES: CPT | Mod: 95 | Performed by: COUNSELOR

## 2022-03-24 NOTE — PROGRESS NOTES
"[facility]                                     Progress Note    Patient Name: Sanaz Lange  Date: 3/24/2022           Service Type: Individual      Session Start Time: 9:01am  Session End Time: 9:40am     Session Length: 39 mins    Session #: 7    Attendees: Client attended alone    Service Modality:  Video Visit:      Provider verified identity through the following two step process.  Patient provided:  Patient is known previously to provider    Telemedicine Visit: The patient's condition can be safely assessed and treated via synchronous audio and visual telemedicine encounter.      Reason for Telemedicine Visit: Services only offered telehealth    Originating Site (Patient Location): Patient's home    Distant Site (Provider Location): Provider Remote Setting- Home Office    Consent:  The patient/guardian has verbally consented to: the potential risks and benefits of telemedicine (video visit) versus in person care; bill my insurance or make self-payment for services provided; and responsibility for payment of non-covered services.     Patient would like the video invitation sent by:  My Chart    Mode of Communication:  Video Conference via Amwell    As the provider I attest to compliance with applicable laws and regulations related to telemedicine.    DATA  Interactive Complexity: No  Crisis: No        Progress Since Last Session (Related to Symptoms / Goals / Homework):   Symptoms: Improving consistent with medications and feeling \"much better\"    Homework: Achieved / completed to satisfaction      Episode of Care Goals: Satisfactory progress - ACTION (Actively working towards change); Intervened by reinforcing change plan / affirming steps taken     Current / Ongoing Stressors and Concerns:   Online class   Summer job application    Spending summer at home (parents argue)     Treatment Objective(s) Addressed in This Session:   managing time/planning for online class   Boundaries " with parents and mom      Intervention:   Emotion Focused Therapy: supportive listening, emotional processing   Interpersonal Therapy: boundaries with family of origin   Solution Focused: planning for online class work     Assessments completed prior to visit:  The following assessments were completed by patient for this visit:  PHQ9:   PHQ-9 SCORE 7/7/2021 8/5/2021 9/21/2021 10/7/2021 10/27/2021 1/18/2022 3/2/2022   PHQ-9 Total Score MyChart 11 (Moderate depression) - - 13 (Moderate depression) - 10 (Moderate depression) 10 (Moderate depression)   PHQ-9 Total Score 11 16 14 13 12 10 10   PHQ-A Total Score - - - - - - -     GAD7:   JUANJOSE-7 SCORE 3/18/2021 7/7/2021 8/5/2021 9/21/2021 10/7/2021 10/27/2021 1/18/2022   Total Score - 15 (severe anxiety) - - 16 (severe anxiety) - 13 (moderate anxiety)   Total Score 11 15 14 13 16 12 13     PROMIS 10-Global Health (only subscores and total score):   PROMIS-10 Scores Only 1/18/2022   Global Mental Health Score 12   Global Physical Health Score 17   PROMIS TOTAL - SUBSCORES 29         ASSESSMENT: Current Emotional / Mental Status (status of significant symptoms):   Risk status (Self / Other harm or suicidal ideation)   Patient denies current fears or concerns for personal safety.   Patient denies current or recent suicidal ideation or behaviors.   Patient denies current or recent homicidal ideation or behaviors.   Patient denies current or recent self injurious behavior or ideation.   Patient denies other safety concerns.   Patient reports there has been no change in risk factors since their last session.     Patient reports there has been a chance in protective factors since their last session.  med changes and made med schedule    A safety and risk management plan has been developed including: Patient has no change in safety concerns. Committed to safety and agreed to follow previously developed safety plan..  Patient consented to co-developed safety plan.  Safety and risk  management plan was completed.  Patient agreed to use safety plan should any safety concerns arise.  A copy was given to the patient.     Appearance:   Appropriate    Eye Contact:   Good    Psychomotor Behavior: Normal    Attitude:   Cooperative    Orientation:   All   Speech    Rate / Production: Normal     Volume:  Normal    Mood:    Normal   Affect:    Appropriate    Thought Content:  Clear    Thought Form:  Coherent  Logical    Insight:    Good  and Fair      Medication Review:   Changes to psychiatric medications, see updated Medication List in EPIC.      Medication Compliance:   Yes     Changes in Health Issues:   None reported     Chemical Use Review:   Substance Use: Chemical use reviewed, no active concerns identified      Tobacco Use: No current tobacco use.      Diagnosis:  1. Major depressive disorder, recurrent episode, moderate (H)    2. Attention deficit hyperactivity disorder, combined type    3. Generalized anxiety disorder        Collateral Reports Completed:   Not Applicable    PLAN: (Patient Tasks / Therapist Tasks / Other)  Provider assigned client to look into online class syllabus to plan   Provider assigned client to fill out summer work application         Marilyn Ga [facility] 3/24/2022                                                            ______________________________________________________________________     Individual Treatment Plan     Patient's Name: Sanaz Lange                 YOB: 2000     Date of Creation: 3/2/2022     Date Treatment Plan Last Reviewed/Revised: 3/2/2022        DSM5 Diagnoses:   1. Major depressive disorder, recurrent episode, moderate (H)    2. Attention deficit hyperactivity disorder, combined type    3. Generalized anxiety disorder       Psychosocial / Contextual Factors: reading, emotional regulation  PROMIS (reviewed every 90 days): 29     Referral / Collaboration:  Referral to another professional/service is not indicated at this  time..     Anticipated number of session for this episode of care: 4 (this provider going on leave)  Anticipation frequency of session: Every other week  Anticipated Duration of each session: 38-52 minutes  Treatment plan will be reviewed in 90 days or when goals have been changed.         MeasurableTreatment Goal(s) related to diagnosis / functional impairment(s)  Goal 1: Patient will take medications 100% of the time     I will know I've met my goal when .             Marilyn Ga, [facility]                       March 2, 2022

## 2022-03-28 NOTE — ASSESSMENT & PLAN NOTE
Mood is essentially stable on current medication regimen.  Primary symptom is executive functioning impairment (inability to focus, impaired organization and planning, reduced working memory). The methylphenidate LA 20 mg is not as effective as it has been.  In addition, she only needs for studying and classes.  Therefore will transition to methylphenidate IR and increase to 20 mg twice a day or she can just time taking one tablet if she has a 4 hour window for studying and not take the second tablet.  Continue remaining medications at current dosages. Follow-up two months

## 2022-03-31 ENCOUNTER — TELEPHONE (OUTPATIENT)
Dept: BEHAVIORAL HEALTH | Facility: CLINIC | Age: 22
End: 2022-03-31
Payer: COMMERCIAL

## 2022-03-31 DIAGNOSIS — F32.0 MILD MAJOR DEPRESSION (H): ICD-10-CM

## 2022-03-31 NOTE — TELEPHONE ENCOUNTER
[facility] pharmacy called. They and the patient believe the lamotrigine prescription was sent in wrong. It was sent as 1 per day and they believe it should be 2 per day. Call Pharmacy at: [phone]. Callers name was Poppy.

## 2022-04-01 RX ORDER — LAMOTRIGINE 25 MG/1
50 TABLET ORAL DAILY
Qty: 180 TABLET | Refills: 1 | Status: SHIPPED | OUTPATIENT
Start: 2022-04-01 | End: 2023-01-11

## 2022-04-01 RX ORDER — LAMOTRIGINE 25 MG/1
25 TABLET ORAL DAILY
Qty: 180 TABLET | Refills: 1 | Status: SHIPPED | OUTPATIENT
Start: 2022-04-01 | End: 2022-04-01

## 2022-04-14 ENCOUNTER — VIRTUAL VISIT (OUTPATIENT)
Dept: PSYCHOLOGY | Facility: CLINIC | Age: 22
End: 2022-04-14
Payer: COMMERCIAL

## 2022-04-14 DIAGNOSIS — F41.1 GENERALIZED ANXIETY DISORDER: ICD-10-CM

## 2022-04-14 DIAGNOSIS — F33.1 MAJOR DEPRESSIVE DISORDER, RECURRENT EPISODE, MODERATE (H): Primary | ICD-10-CM

## 2022-04-14 DIAGNOSIS — F90.2 ATTENTION DEFICIT HYPERACTIVITY DISORDER, COMBINED TYPE: ICD-10-CM

## 2022-04-14 PROCEDURE — 90834 PSYTX W PT 45 MINUTES: CPT | Mod: 95 | Performed by: COUNSELOR

## 2022-04-14 ASSESSMENT — ANXIETY QUESTIONNAIRES
6. BECOMING EASILY ANNOYED OR IRRITABLE: SEVERAL DAYS
GAD7 TOTAL SCORE: 9
3. WORRYING TOO MUCH ABOUT DIFFERENT THINGS: SEVERAL DAYS
7. FEELING AFRAID AS IF SOMETHING AWFUL MIGHT HAPPEN: SEVERAL DAYS
1. FEELING NERVOUS, ANXIOUS, OR ON EDGE: SEVERAL DAYS
2. NOT BEING ABLE TO STOP OR CONTROL WORRYING: SEVERAL DAYS
5. BEING SO RESTLESS THAT IT IS HARD TO SIT STILL: NEARLY EVERY DAY

## 2022-04-14 ASSESSMENT — PATIENT HEALTH QUESTIONNAIRE - PHQ9
5. POOR APPETITE OR OVEREATING: SEVERAL DAYS
SUM OF ALL RESPONSES TO PHQ QUESTIONS 1-9: 9

## 2022-04-14 NOTE — PROGRESS NOTES
[facility]                                     Progress Note    Patient Name: Sanaz Lange  Date: 4/14/2022           Service Type: Individual      Session Start Time: 9:00am  Session End Time: 9:40am     Session Length: 40 mins     Session #: 8    Attendees: Client attended alone    Service Modality:  Video Visit:      Provider verified identity through the following two step process.  Patient provided:  Patient is known previously to provider    Telemedicine Visit: The patient's condition can be safely assessed and treated via synchronous audio and visual telemedicine encounter.      Reason for Telemedicine Visit: Services only offered telehealth    Originating Site (Patient Location): Patient's home    Distant Site (Provider Location): Provider Remote Setting- Home Office    Consent:  The patient/guardian has verbally consented to: the potential risks and benefits of telemedicine (video visit) versus in person care; bill my insurance or make self-payment for services provided; and responsibility for payment of non-covered services.     Patient would like the video invitation sent by:  My Chart    Mode of Communication:  Video Conference via Amwell    As the provider I attest to compliance with applicable laws and regulations related to telemedicine.    DATA  Interactive Complexity: No  Crisis: No        Progress Since Last Session (Related to Symptoms / Goals / Homework):   Symptoms: Improving      Homework: Achieved / completed to satisfaction      Episode of Care Goals: Satisfactory progress - ACTION (Actively working towards change); Intervened by reinforcing change plan / affirming steps taken     Current / Ongoing Stressors and Concerns:   Emotional regulation   Sleep (trouble waking even with adequate sleep time)   Finals upcoming    Moving home for the summer     Treatment Objective(s) Addressed in This Session:   update tx goals   Resources for LD testing   Sleep concerns       Intervention:   Emotion Focused Therapy: supportive listening, affirmations, emotional processing  Solution Focused: sleep study    Assessments completed prior to visit:  The following assessments were completed by patient for this visit:  PHQ9:   PHQ-9 SCORE 8/5/2021 9/21/2021 10/7/2021 10/27/2021 1/18/2022 3/2/2022 4/14/2022   PHQ-9 Total Score MyChart - - 13 (Moderate depression) - 10 (Moderate depression) 10 (Moderate depression) -   PHQ-9 Total Score 16 14 13 12 10 10 9   PHQ-A Total Score - - - - - - -     GAD7:   JUANJOSE-7 SCORE 7/7/2021 8/5/2021 9/21/2021 10/7/2021 10/27/2021 1/18/2022 4/14/2022   Total Score 15 (severe anxiety) - - 16 (severe anxiety) - 13 (moderate anxiety) -   Total Score 15 14 13 16 12 13 9         ASSESSMENT: Current Emotional / Mental Status (status of significant symptoms):   Risk status (Self / Other harm or suicidal ideation)   Patient denies current fears or concerns for personal safety.   Patient denies current or recent suicidal ideation or behaviors.   Patient denies current or recent homicidal ideation or behaviors.   Patient denies current or recent self injurious behavior or ideation.   Patient denies other safety concerns.   Patient reports there has been no change in risk factors since their last session.     Patient reports there has been no change in protective factors since their last session.     A safety and risk management plan has been developed including: Patient has no change in safety concerns. Committed to safety and agreed to follow previously developed safety plan..  Patient consented to co-developed safety plan.  Safety and risk management plan was completed.  Patient agreed to use safety plan should any safety concerns arise.  A copy was given to the patient.     Appearance:   Appropriate    Eye Contact:   Good    Psychomotor Behavior: Normal    Attitude:   Cooperative    Orientation:   All   Speech    Rate / Production: Normal     Volume:  Normal     Mood:    Normal   Affect:    Appropriate    Thought Content:  Clear    Thought Form:  Coherent  Logical    Insight:    Good  and Fair      Medication Review:   No changes to current psychiatric medication(s)     Medication Compliance:   Yes     Changes in Health Issues:   Yes: headaches, Associated Psychological Distress     Chemical Use Review:   Substance Use: Chemical use reviewed, no active concerns identified      Tobacco Use: No current tobacco use.      Diagnosis:  1. Major depressive disorder, recurrent episode, moderate (H)    2. Attention deficit hyperactivity disorder, combined type    3. Generalized anxiety disorder        Collateral Reports Completed:   Not Applicable    PLAN: (Patient Tasks / Therapist Tasks / Other)  Provider assigned client to look into sleep study and access services for LD testing  Provider assigned client to consider any added tx goals         Marilyn Ga, [facility] 4/14/2022                                                           ______________________________________________________________________     Individual Treatment Plan     Patient's Name: Sanaz Lange                 YOB: 2000     Date of Creation: 3/2/2022     Date Treatment Plan Last Reviewed/Revised: 3/2/2022        DSM5 Diagnoses:   1. Major depressive disorder, recurrent episode, moderate (H)    2. Attention deficit hyperactivity disorder, combined type    3. Generalized anxiety disorder       Psychosocial / Contextual Factors: reading, emotional regulation  PROMIS (reviewed every 90 days): 29     Referral / Collaboration:  Referral to another professional/service is not indicated at this time..     Anticipated number of session for this episode of care: 4 (this provider going on leave)  Anticipation frequency of session: Every other week  Anticipated Duration of each session: 38-52 minutes  Treatment plan will be reviewed in 90 days or when goals have been changed.          MeasurableTreatment Goal(s) related to diagnosis / functional impairment(s)  Goal 1: Patient will take medications 100% of the time     I will know I've met my goal when I will know I've met my goal when I wake up and go places on time (currently 5-10 minutes behind).       Marilyn Ga, [facility]  April 14, 2022   /OB/GYN

## 2022-04-15 ASSESSMENT — ANXIETY QUESTIONNAIRES: GAD7 TOTAL SCORE: 9

## 2022-04-28 ENCOUNTER — VIRTUAL VISIT (OUTPATIENT)
Dept: PSYCHOLOGY | Facility: CLINIC | Age: 22
End: 2022-04-28
Payer: COMMERCIAL

## 2022-04-28 DIAGNOSIS — F33.1 MAJOR DEPRESSIVE DISORDER, RECURRENT EPISODE, MODERATE (H): Primary | ICD-10-CM

## 2022-04-28 DIAGNOSIS — F41.1 GENERALIZED ANXIETY DISORDER: ICD-10-CM

## 2022-04-28 DIAGNOSIS — F90.2 ATTENTION DEFICIT HYPERACTIVITY DISORDER, COMBINED TYPE: ICD-10-CM

## 2022-04-28 PROCEDURE — 90834 PSYTX W PT 45 MINUTES: CPT | Mod: 95 | Performed by: COUNSELOR

## 2022-04-28 NOTE — PROGRESS NOTES
[facility]                                     Progress Note    Patient Name: Sanaz Lange  Date: 4/28/2022         Service Type: Individual      Session Start Time: 9:00am  Session End Time: 9:41am     Session Length: 41 mins     Session #: 9    Attendees: Client attended alone    Service Modality:  Video Visit:      Provider verified identity through the following two step process.  Patient provided:  Patient is known previously to provider    Telemedicine Visit: The patient's condition can be safely assessed and treated via synchronous audio and visual telemedicine encounter.      Reason for Telemedicine Visit: Services only offered telehealth    Originating Site (Patient Location): Patient's home    Distant Site (Provider Location): Provider Remote Setting- Home Office    Consent:  The patient/guardian has verbally consented to: the potential risks and benefits of telemedicine (video visit) versus in person care; bill my insurance or make self-payment for services provided; and responsibility for payment of non-covered services.     Patient would like the video invitation sent by:  My Chart    Mode of Communication:  Video Conference via Amwell    As the provider I attest to compliance with applicable laws and regulations related to telemedicine.    DATA  Interactive Complexity: No  Crisis: No        Progress Since Last Session (Related to Symptoms / Goals / Homework):   Symptoms: Improving stable    Homework: Partially completed      Episode of Care Goals: Satisfactory progress - ACTION (Actively working towards change); Intervened by reinforcing change plan / affirming steps taken     Current / Ongoing Stressors and Concerns:   Roommate relationships   Finishing online class   Trouble staying on task      Treatment Objective(s) Addressed in This Session:   Use weekly communication check ins with roommate           Intervention:   Emotion Focused Therapy: supportive listening  emotional processing  Interpersonal Therapy: communication     Assessments completed prior to visit:  The following assessments were completed by patient for this visit:  PHQ9:   PHQ-9 SCORE 8/5/2021 9/21/2021 10/7/2021 10/27/2021 1/18/2022 3/2/2022 4/14/2022   PHQ-9 Total Score MyChart - - 13 (Moderate depression) - 10 (Moderate depression) 10 (Moderate depression) -   PHQ-9 Total Score 16 14 13 12 10 10 9   PHQ-A Total Score - - - - - - -     GAD7:   JUANJOSE-7 SCORE 7/7/2021 8/5/2021 9/21/2021 10/7/2021 10/27/2021 1/18/2022 4/14/2022   Total Score 15 (severe anxiety) - - 16 (severe anxiety) - 13 (moderate anxiety) -   Total Score 15 14 13 16 12 13 9         ASSESSMENT: Current Emotional / Mental Status (status of significant symptoms):   Risk status (Self / Other harm or suicidal ideation)   Patient denies current fears or concerns for personal safety.   Patient denies current or recent suicidal ideation or behaviors.   Patient denies current or recent homicidal ideation or behaviors.   Patient denies current or recent self injurious behavior or ideation.   Patient denies other safety concerns.   Patient reports there has been no change in risk factors since their last session.     Patient reports there has been no change in protective factors since their last session.     A safety and risk management plan has been developed including: Patient has no change in safety concerns. Committed to safety and agreed to follow previously developed safety plan..  Patient consented to co-developed safety plan.  Safety and risk management plan was completed.  Patient agreed to use safety plan should any safety concerns arise.  A copy was given to the patient.     Appearance:   Appropriate    Eye Contact:   Good    Psychomotor Behavior: Normal    Attitude:   Cooperative    Orientation:   All   Speech    Rate / Production: Normal     Volume:  Normal    Mood:    Normal   Affect:    Appropriate    Thought Content:  Clear    Thought  Form:  Coherent  Logical    Insight:    Fair      Medication Review:   No changes to current psychiatric medication(s)     Medication Compliance:   Yes     Changes in Health Issues:   None reported     Chemical Use Review:   Substance Use: Chemical use reviewed, no active concerns identified      Tobacco Use: No current tobacco use.      Diagnosis:  1. Major depressive disorder, recurrent episode, moderate (H)    2. Attention deficit hyperactivity disorder, combined type    3. Generalized anxiety disorder        Collateral Reports Completed:   Not Applicable    PLAN: (Patient Tasks / Therapist Tasks / Other)  Provider assigned client to return [facility] transfer call to schedule with provider while this provider is out  Provider assigned client to contact [facility] for testing         Marilyn Ga [facility] 4/28/2022                                                             ______________________________________________________________________     Individual Treatment Plan     Patient's Name: Sanaz Lange                 YOB: 2000     Date of Creation: 3/2/2022     Date Treatment Plan Last Reviewed/Revised: 3/2/2022        DSM5 Diagnoses:   1. Major depressive disorder, recurrent episode, moderate (H)    2. Attention deficit hyperactivity disorder, combined type    3. Generalized anxiety disorder       Psychosocial / Contextual Factors: reading, emotional regulation  PROMIS (reviewed every 90 days): 29     Referral / Collaboration:  Referral to another professional/service is not indicated at this time..     Anticipated number of session for this episode of care: 4 (this provider going on leave)  Anticipation frequency of session: Every other week  Anticipated Duration of each session: 38-52 minutes  Treatment plan will be reviewed in 90 days or when goals have been changed.         MeasurableTreatment Goal(s) related to diagnosis / functional impairment(s)  Goal 1: Patient will take medications  "100% of the time     I will know I've met my goal when I will know I've met my goal when I wake up and go places on time (currently 5-10 minutes behind).I will be following the schedule in my planner (keep up with work), working on online classes on a regular basis.\"        Marilyn Ga, [facility]                       April 14, 2022      "

## 2022-05-04 NOTE — PROGRESS NOTES
[facility] behavioral health [facility]  May 10, 2022      Behavioral Health Clinician Progress Note    Patient Name: Sanaz Lange           Service Type:  Individual      Service Location:   [place] / Email (patient reached)     Session Start Time: 1000am  Session End Time: 1018am      Session Length: 16 - 37      Attendees: Patient    Visit Activities (Refresh list every visit): [facility] Only    Diagnostic Assessment Date: 10/09/2021  Treatment Plan Review Date: 6/09/2022  See Flowsheets for today's PHQ-9 and JUANJOSE-7 results  Previous PHQ-9:   PHQ-9 SCORE 1/18/2022 3/2/2022 4/14/2022   PHQ-9 Total Score [place] 10 (Moderate depression) 10 (Moderate depression) -   PHQ-9 Total Score 10 10 9   PHQ-A Total Score - - -     Previous JUANJOSE-7:   JUANJOSE-7 SCORE 10/27/2021 1/18/2022 4/14/2022   Total Score - 13 (moderate anxiety) -   Total Score 12 13 9       DEYSI LEVEL:  No flowsheet data found.    DATA  Extended Session (60+ minutes): No  Interactive Complexity: No  Crisis: No  [facility] Patient: No    Treatment Objective(s) Addressed in This Session:  Target Behavior(s): improve med compliance    Depressed Mood: Decrease frequency and intensity of feeling down, depressed, hopeless    Current Stressors / Issues:  MH update: Pt reports still no improvement with regard to attention. Depression is improved. Feels like her moods are more stable. She hasn't noticed any change in attention as her mood has improved. Explored how her thoughts may influence or interact with her depression. She hasn't noticed any so [facility] suggested homework to review past depression episodes in order to uncover any cognitive distortions that may influence her mood. Denies any other patterns (weather etc).  Stresses: home from college, new job  Appetite: unremarkable  Sleep: unremarkable  Therapy: Marilyn Ga with [facility]  EVE: No  Preg: No  Interventions: skills to manage depression such as decreasing isolation, music, pt given homework to monitor  thoughts and how they influence her mood  Most important: ongoing attention issues      Progress on Treatment Objective(s) / Homework:  Satisfactory progress - ACTION (Actively working towards change); Intervened by reinforcing change plan / affirming steps taken    Motivational Interviewing    MI Intervention: Co-Developed Goal: maintain med compliance, Expressed Empathy/Understanding, Open-ended questions, Reflections: simple and complex and Change talk (evoked)     Change Talk Expressed by the Patient: Desire to change Ability to change Reasons to change Need to change Committment to change Activation Taking steps    Provider Response to Change Talk: E - Evoked more info from patient about behavior change, A - Affirmed patient's thoughts, decisions, or attempts at behavior change, R - Reflected patient's change talk and S - Summarized patient's change talk statements      Care Plan review completed: Yes    Medication Review:  No changes to current psychiatric medication(s)    Medication Compliance:  Yes    Changes in Health Issues:   None reported    Chemical Use Review:   Substance Use: Chemical use reviewed, no active concerns identified      Tobacco Use: No current tobacco use.      Assessment: Current Emotional / Mental Status (status of significant symptoms):  Risk status (Self / Other harm or suicidal ideation)  Patient denies a history of suicidal ideation, suicide attempts, self-injurious behavior, homicidal ideation, homicidal behavior and and other safety concerns  Patient denies current fears or concerns for personal safety.  Patient denies current or recent suicidal ideation or behaviors.  Patient denies current or recent homicidal ideation or behaviors.  Patient denies current or recent self injurious behavior or ideation.  Patient denies other safety concerns.  A safety and risk management plan has not been developed at this time, however patient was encouraged to call [facility] should  there be a change in any of these risk factors.    Appearance:   Appropriate   Eye Contact:   Good   Psychomotor Behavior: Normal   Attitude:   Cooperative   Orientation:   All  Speech   Rate / Production: Normal    Volume:  Normal   Mood:    Depressed   Affect:    Appropriate   Thought Content:  Clear   Thought Form:  Coherent  Logical   Insight:    Good     Diagnoses:  1. Depression, major, recurrent, mild (H)        Collateral Reports Completed:  Collaborated with Usha Hobbs, MSN, APRN, CNP, FMHNP-BC, [facility]    Plan: (Homework, other):  Patient was given information about behavioral services and encouraged to schedule a follow up appointment with the clinic [facility] in 1 month.  She was also given information about mental health symptoms and treatment options .  CD Recommendations: No indications of CD issues.  Saida Rodriguez MSW [facility]      ______________________________________________________________________    Integrated Primary Care Behavioral Health Treatment Plan    Patient's Name: Sanaz Lange  YOB: 2000    Date of Creation: 03/09/2022  Date Treatment Plan Last Reviewed/Revised: NA    DSM5 Diagnoses: Attention-Deficit/Hyperactivity Disorder  314.00 (F90.0) Predominantly inattentive presentation, 296.32 (F33.1) Major Depressive Disorder, Recurrent Episode, Moderate _ and With anxious distress or 300.02 (F41.1) Generalized Anxiety Disorder  Psychosocial / Contextual Factors: none identified  PROMIS (reviewed every 90 days): 29    Referral / Collaboration:  Referral to another professional/service is not indicated at this time..    Anticipated number of session for this episode of care: 4-6  Anticipation frequency of session: Monthly  Anticipated Duration of each session: 16-37 minutes  Treatment plan will be reviewed in 90 days or when goals have been changed.       MeasurableTreatment Goal(s) related to diagnosis / functional impairment(s)  Goal 1: Patient will  experience improved depression    I will know I've met my goal when I have less feelings of helplessness and hopelessness.      Objective #A (Patient Action)    Patient will Decrease frequency and intensity of feeling down, depressed, hopeless.  Status: Continued - Date(s): 06/08/2022    Intervention(s)  Therapist will teach strategies to challenge destructive self talk.    Patient has reviewed and agreed to the above plan.      Saida Rodriguez, [facility]  May 10, 2022

## 2022-05-10 ENCOUNTER — VIRTUAL VISIT (OUTPATIENT)
Dept: PSYCHIATRY | Facility: CLINIC | Age: 22
End: 2022-05-10
Payer: COMMERCIAL

## 2022-05-10 ENCOUNTER — VIRTUAL VISIT (OUTPATIENT)
Dept: BEHAVIORAL HEALTH | Facility: CLINIC | Age: 22
End: 2022-05-10
Payer: COMMERCIAL

## 2022-05-10 DIAGNOSIS — F98.8 ATTENTION DEFICIT DISORDER, UNSPECIFIED HYPERACTIVITY PRESENCE: Primary | ICD-10-CM

## 2022-05-10 DIAGNOSIS — F32.0 MILD MAJOR DEPRESSION (H): ICD-10-CM

## 2022-05-10 DIAGNOSIS — F33.0 DEPRESSION, MAJOR, RECURRENT, MILD (H): Primary | ICD-10-CM

## 2022-05-10 DIAGNOSIS — F41.9 ANXIETY: ICD-10-CM

## 2022-05-10 PROCEDURE — 99214 OFFICE O/P EST MOD 30 MIN: CPT | Mod: 95 | Performed by: NURSE PRACTITIONER

## 2022-05-10 PROCEDURE — 90832 PSYTX W PT 30 MINUTES: CPT | Mod: 95 | Performed by: SOCIAL WORKER

## 2022-05-10 RX ORDER — DEXTROAMPHETAMINE SACCHARATE, AMPHETAMINE ASPARTATE MONOHYDRATE, DEXTROAMPHETAMINE SULFATE AND AMPHETAMINE SULFATE 5; 5; 5; 5 MG/1; MG/1; MG/1; MG/1
20 CAPSULE, EXTENDED RELEASE ORAL DAILY
Qty: 14 CAPSULE | Refills: 0 | Status: SHIPPED | OUTPATIENT
Start: 2022-05-10 | End: 2022-06-01

## 2022-05-10 RX ORDER — VENLAFAXINE HYDROCHLORIDE 150 MG/1
150 CAPSULE, EXTENDED RELEASE ORAL DAILY
Qty: 90 CAPSULE | Refills: 0 | Status: SHIPPED | OUTPATIENT
Start: 2022-05-10 | End: 2022-06-28

## 2022-05-10 NOTE — PROGRESS NOTES
PSYCHIATRIC MEDICATION FOLLOW UP APPT     Name:  Sanaz Lange  : 2000    Sanaz Lange is a 21 year old female who is being evaluated via a billable video visit.      How would you like to obtain your AVS? MyChart  If the video visit is dropped, the invitation should be resent by: Text to cell phone: [phone]  Will anyone else be joining your video visit? No        [place] If not [place] address below, please ask where they are in case of an emergency situation arises during the appointment.  [address] [facility] [address]    Telemedicine Visit: The patient's condition can be safely assessed and treated via synchronous audio and visual telemedicine encounter.      Reason for Telemedicine Visit: COVID 19 pandemic and the social and physical recommendations by the CDC and MD.      Originating Site (Patient Location): Patient's home    Distant Site (Provider Location): Provider Remote Setting    Consent:  The patient/guardian has verbally consented to: the potential risks and benefits of telemedicine (video visit or phone) versus in person care; bill my insurance or make self-payment for services provided; and responsibility for payment of non-covered services.     Mode of Communication:  PodPonics video platform     As the provider I attest to compliance with applicable laws and regulations related to telemedicine.    IDENTIFICATION   Referred by: Blessing Wadsworth PA-C [facility]   Therapist: Marilyn with [facility]     Patient attended the phone/video session alone.    Last seen for outpatient psychiatry Return Visit on 3/9/2022.      FOLLOWING PLAN PUT INTO PLACE: Mood is essentially stable on current medication regimen.  Primary symptom is executive functioning impairment (inability to focus, impaired organization and planning, reduced working memory). The methylphenidate LA 20 mg is not as effective as it has  been.  In addition, she only needs for studying and classes.  Therefore will transition to methylphenidate IR and increase to 20 mg twice a day or she can just time taking one tablet if she has a 4 hour window for studying and not take the second tablet.  Continue remaining medications at current dosages. Follow-up two months        INTERIM HISTORY     COMMUNICATIONS FROM PATIENT VIA:  none    RECORDS AVAILABLE FOR REVIEW: EHR records through [facility] .  In addition, reviewed the assessment completed by Saida Rodriguez [facility], dated today       HISTORY OF PRESENT ILLNESS   CCPS referral for psychiatric medication consult in 10/07/2021.  Reports history of depression and ADHD.  Denies prior psychiatric hospitalizations. No history of suicidal thoughts or attempts. No history of self-injurious behaviors. Genetically loaded for  anxiety, depression and ADHD.. Grew up in an intact home with all basic needs being met.     Reports past diagnosis of depression and ADHD. First sought treatment in around 2018 in the context of depression.  Felt sad all the time, emotional, felt she was letting people down, couldn't focus during class.  She was started with therapy and anitdepressants.  Subsequently diagnosis with ADHD and when looking back at elementary, dyllan and high school years endorses long standing distractibility, restlessness, poor concentration, inattention, forgetfulness, need for repetition of instructions, and poor listening.       FAMILY, MEDICAL, SURGICAL HISTORY REVIEWED.  MEDICATION HAVE BEEN REVIEWED AND ARE CURRENT TO THE BEST OF MY KNOWLEDGE AND ABILITY.  Student at [employer] in fall 2022  Living with a roommate  Will start a job at a  for the summer    MEDICATIONS                                                                                                Current Outpatient Medications   Medication Sig     Cetirizine HCl (ZYRTEC PO)      cholecalciferol (VITAMIN D3) 125 MCG (5000 UT)  "TABS tablet Take 1 tablet (5,000 Units) by mouth daily     hydrOXYzine (ATARAX) 25 MG tablet Take 1 tablet (25 mg) by mouth 2 times daily as needed for anxiety (or panic)     lamoTRIgine (LAMICTAL) 25 MG tablet Take 2 tablets (50 mg) by mouth daily     methylphenidate (RITALIN) 20 MG tablet Take 1 tablet (20 mg) by mouth 2 times daily     norgestim-eth estrad triphasic (TRI-LO-SPRINTEC) 0.18/0.215/0.25 MG-25 MCG tablet Take 1 tablet by mouth daily     venlafaxine (EFFEXOR-XR) 150 MG 24 hr capsule Take 1 capsule (150 mg) by mouth daily     No current facility-administered medications for this visit.        NOTES ABOUT CURRENT PSYCHOTROPIC MEDICATIONS:   Ritalin 20 IR 20 mg twice a day   Venlafaxine  mg, no benefit from increase to 187.5 mg in 11/2021  Hydroxyzine 25 mg twice a day as needed anxiety, taking it 2 times at school  Lamotrigine 50 mg       Supplements: vitamin D. Omega Fish oil/omega-3, MV     PAST PSYCHOTROPIC MEDICATIONS:  Fluoxetine   Citalopram   Methylphenidate LA 20 mg not effective      TODAY PATIENT REPORTS THE FOLLOWING PSYCHIATRIC ROS:   Per [facility], Saida Rodriguez, during today's team-based visit:  \" update: Pt reports still no improvement with regard to attention. Depression is improved. Feels like her moods are more stable. She hasn't noticed any change in attention as her mood has improved. Explored how her thoughts may influence or interact with her depression. She hasn't noticed any so [facility] suggested homework to review past depression episodes in order to uncover any cognitive distortions that may influence her mood. Denies any other patterns (weather etc).  Stresses: home from college, new job  Appetite: unremarkable  Sleep: unremarkable  Therapy: Marilyn Ga with [facility]  EVE: No  Preg: No  Interventions: skills to manage depression such as decreasing isolation, music, pt given homework to monitor thoughts and how they influence her mood  Most important: ongoing attention issues\" "     EXERCISE: Adequate  SIDE EFFECTS:   tolerating medications without reported side effects  SUBSTANCE USE:  Denies   COMPLIANCE:   states Adherent to medication regimen  REPORTS THE FOLLOWING NEW MEDICAL ISSUES:   none    PROBLEM: DEPRESSION: Feels the current medication regimen is effective.    Suicidal ideation: Denies       Last PHQ-9 4/14/2022   1.  Little interest or pleasure in doing things 2   2.  Feeling down, depressed, or hopeless 1   3.  Trouble falling or staying asleep, or sleeping too much 1   4.  Feeling tired or having little energy 1   5.  Poor appetite or overeating 1   6.  Feeling bad about yourself 1   7.  Trouble concentrating 2   8.  Moving slowly or restless 0   Q9: Thoughts of better off dead/self-harm past 2 weeks 0   PHQ-9 Total Score 9   Difficulty at work, home, or with people -   In the past two weeks have you had thoughts of suicide or self harm? -   Do you have concerns about your personal safety or the safety of others? -     PHQ-9 SCORE 1/18/2022 3/2/2022 4/14/2022   PHQ-9 Total Score MyChart 10 (Moderate depression) 10 (Moderate depression) -   PHQ-9 Total Score 10 10 9   PHQ-A Total Score - - -     PHQ9 score is Not completed today  Suicidal ideation:  No     PROBLEM: ANXIETY: Feels the current medication regimen is effective..   GAD7 score is Not completed today  JUANJOSE-7 SCORE 10/27/2021 1/18/2022 4/14/2022   Total Score - 13 (moderate anxiety) -   Total Score 12 13 9       PROBLEM: ADHD: No change.  The increase in ritalin to 40 mg total daily dose using IR 20 mg twice a day has not had any positive improvement in attention and focus    PERTINENT PAST MEDICAL AND SURGICAL HISTORY     Past Medical History:   Diagnosis Date     Hypovitaminosis D 9/19/2019     Other specified viral warts 4/2/2015       VITALS     BP Readings from Last 1 Encounters:   07/07/21 110/70     Pulse Readings from Last 1 Encounters:   07/07/21 100     Wt Readings from Last 1 Encounters:   07/07/21 78 kg  "(172 lb) (93 %, Z= 1.46)*     * Growth percentiles are based on CDC (Girls, 2-20 Years) data.     Ht Readings from Last 1 Encounters:   07/07/21 1.638 m (5' 4.5\") (53 %, Z= 0.09)*     * Growth percentiles are based on CDC (Girls, 2-20 Years) data.     Estimated body mass index is 29.07 kg/m  as calculated from the following:    Height as of 7/7/21: 1.638 m (5' 4.5\").    Weight as of 7/7/21: 78 kg (172 lb).    LABS & IMAGING                                                                                                                   Recent Labs   Lab Test 03/18/21  1532 06/04/19  1134   WBC 7.3 6.9   HGB 12.6 11.8   HCT 38.5 36.2   MCV 90 84    281   ANEU  --  3.7     Recent Labs   Lab Test 03/18/21  1532      POTASSIUM 4.2   CHLORIDE 107   CO2 27   *   RENUKA 9.3   BUN 9   CR 0.80   GFRESTIMATED >90   ALBUMIN 3.9   PROTTOTAL 8.1   AST 24   ALT 28   ALKPHOS 49   BILITOTAL 0.1*     No lab results found.  Recent Labs   Lab Test 07/07/21  0919   TSH 1.37   T4 0.86        ALLERGY & IMMUNIZATIONS     No Known Allergies    MEDICAL REVIEW OF SYSTEMS:   Ten system review was completed with pertinent positives noted     MENTAL STATUS EXAM:   General/Constitutional:  Appearance:   awake, alert, adequately groomed, appeared stated age and no apparent distress  Attitude:    cooperative   Eye Contact:  good  Musculoskeletal:  Psychomotor Behavior:  no evidence of tardive dyskinesia, dystonia, or tics from the head up  Psychiatric:  Speech:  clear, coherent, regular rate, rhythm, and volume,   No pressure speech noted.  Associations:  no loose associations  Thought Process:   logical, linear and goal oriented  Thought Content:    No evidence of suicidal ideation or homicidal ideation, no evidence of psychotic thought, no auditory hallucinations present and no visual hallucinations present  Mood:  good  Affect:  full range/stable (normal variation of emotions during exam) and was congruent to speech " content.  Insight:  good  Judgment:  intact, adequate for safety  Impulse Control:  intact  Neurological:  Oriented to:  person, place, time, and situation  Attention Span and Concentration:  normal    Language: intact     Recent and Remote Memory:  Intact to interview. Not formally assessed. No amnesia.    Fund of Knowledge: appropriate        SAFETY   Feels safe in home: Yes   Suicidal ideation: Denies  History of suicide attempts:  No   Hx of impulsivity: No   Hope for the future: present    Hx of Command hallucinations or current psychosis: No  History of Self-injurious behaviors: No Current:  No  Family member  by suicide:  No     SAFETY ASSESSMENT:   Based on all available evidence including the factors cited above, overall Risk for harm is low and is appropriate for outpatient level of care.   Recommended that patient call 911 or go to the local ED should there be a change in any of these risk factors.    DSM 5 DIAGNOSIS:   Attention-Deficit/Hyperactivity Disorder  314.00 (F90.0) Predominantly inattentive presentation  296.31 (F33.0) Major Depressive Disorder, Recurrent Episode, Mild _    MEDICAL COMORBIDITY IMPACTING CLINICAL PICTURE: None noted  ASSESSMENT AND PLAN      Problem List Items Addressed This Visit        Behavioral     Mood is currently stable on the combination of venlafaxine and lamotrigine.  The transition to Ritalin 20 mg IR was not effective in targeting attention and focus.  No significant benefit from doubling the dose.  Therefore will transition to the Adderall family.  Adderall XR 20 mg with a quantity of 14 sent to the pharmacy.  She will update this provider via [facility] on the efficacy and tolerability and will then send a prescription for 30 additional days with a follow-up in 6 weeks           Attention deficit disorder, unspecified hyperactivity presence - Primary    Relevant Medications    amphetamine-dextroamphetamine (ADDERALL XR) 20 MG 24 hr capsule    Mild major depression  (H)    Relevant Medications    venlafaxine (EFFEXOR XR) 150 MG 24 hr capsule       Other    Anxiety    Relevant Medications    venlafaxine (EFFEXOR XR) 150 MG 24 hr capsule           CONSULTS/REFERRALS:   Continue therapy   Coordinate care with therapist as needed     MEDICAL:   None at this time  Coordinate care with PCP (Blessing Wadsworth) as needed  Follow up with primary care provider as planned or for acute medical concerns.     PSYCHOEDUCATION:  Medication side effects and alternatives reviewed. Health promotion activities recommended and reviewed today. All questions addressed. Education and counseling completed regarding risks and benefits of medications and psychotherapy options.  Consent provided by patient/guardian  Call the psychiatric nurse line with medication questions or concerns at [phone].  DepoMedt may be used to communicate with your provider, but this is not intended to be used for emergencies.  BLACK BOX WARNING: Discussed the Food and Drug Administration (FDA) requires that all antidepressants carry a warning that some children, adolescents and young adults may be at increased risk of suicide when taking antidepressants. Anyone taking an antidepressant should be watched closely for worsening depression or unusual behavior especially in the first few weeks after starting an SSRI. Keep in mind, antidepressants are more likely to reduce suicide risk in the long run by improving mood.   STIMULANT THERAPY: Side effects discussed including but not limited to cardiac (including HTN, tachycardia, sudden death), motor/tic, appetite/growth, mood lability and sleep disruption. This is a controlled substance with risk for abuse, need to keep in a safe keep place and cannot replace lost scripts  Medlineplus.gov is information for patients.  It is run by the [employer] and it contains information about all disorders, diseases and all medications.       COMMUNITY RESOURCES:    CRISIS  NUMBERS: Provided in AVS 10/7/2021  National Suicide Prevention Lifeline: 2-668-805-TALK (801-317-6786)  YumDots/resources for a list of additional resources (SOS)            [facility] - [phone]   Urgent Care Adult Mental Bknsnj-[phone] mobile unit/  crisis line  [facility] -[phone]   COPE  [place] Mobile Team -[phone] (adults)/ [phone] (child)  Poison Control Center - 1-246.943.4766    OR  go to nearest ER  Crisis Text Line for any crisis  send this-   To: 924500   [facility] ([facility]) [facility]  [phone]  National Suicide Prevention Lifeline: 977.132.3494 (TTY: 852.823.3929). Call anytime for help.  (www.suicidepreventionlifeline.org)  National [place] on Mental Illness (www.s[place]ie.org): 444.569.4563 or 843-149-6157.   Mental Health Association (www.mentalhealth.org): 236.248.7723 or 712-573-4942.  [place] Crisis Text Line: Text [place] to 352960  Suicide LifeLine Chat: suicidepreAxoGenline.org/chat      ADMINISTRATIVE BILLIN minutes spent interviewing patient, reviewing referral documents, obtaining and reviewing outside records, communication with other health specialists, and preparing this report.    Video/Phone Start Time:  10:17 AM  Video/Phone End Time:  1036    Greater than 50% of time was spent in counseling and coordination of care regarding above diagnoses and treatment plan.    Patient Status:  Patient will continue to be seen for ongoing consultation and stabilization.    Signed:   Usha Hobbs, MSN, APRN, FMHNP-[facility] ([facility]PS)   Chart documentation done in part with Dragon Voice Recognition software.  Although reviewed after completion, some word and grammatical errors may remain.

## 2022-05-10 NOTE — ASSESSMENT & PLAN NOTE
Mood is currently stable on the combination of venlafaxine and lamotrigine.  The transition to Ritalin 20 mg IR was not effective in targeting attention and focus.  No significant benefit from doubling the dose.  Therefore will transition to the Adderall family.  Adderall XR 20 mg with a quantity of 14 sent to the pharmacy.  She will update this provider via [facility] on the efficacy and tolerability and will then send a prescription for 30 additional days with a follow-up in 6 weeks

## 2022-06-24 NOTE — PROGRESS NOTES
"[facility] behavioral health CCPS  June 28, 2022      Behavioral Health Clinician Progress Note    Patient Name: Sanaz Lange           Service Type:  Individual      Service Location:   MyChart / Email (patient reached)    Type: video visit via [facility]   Pt location: home   Provider location: Home office     Session Start Time: 430pm  Session End Time: 446pm      Session Length: 16 - 37      Attendees: Patient    Visit Activities (Refresh list every visit): [facility] Only    Diagnostic Assessment Date: 10/09/2021  Treatment Plan Review Date: 9/09/2022  See Flowsheets for today's PHQ-9 and JUANJOSE-7 results  Previous PHQ-9:   PHQ-9 SCORE 3/2/2022 4/14/2022 6/28/2022   PHQ-9 Total Score MyChart 10 (Moderate depression) - 8 (Mild depression)   PHQ-9 Total Score 10 9 8   PHQ-A Total Score - - -     Previous JUANJOSE-7:   JUANJOSE-7 SCORE 10/27/2021 1/18/2022 4/14/2022   Total Score - 13 (moderate anxiety) -   Total Score 12 13 9       DEYSI LEVEL:  No flowsheet data found.    DATA  Extended Session (60+ minutes): No  Interactive Complexity: No  Crisis: No  [facility] Patient: No    Treatment Objective(s) Addressed in This Session:  Target Behavior(s): improve med compliance    Depressed Mood: Decrease frequency and intensity of feeling down, depressed, hopeless    Current Stressors / Issues:  MH update: Pt reports that her attention and depression symptoms have improved. She feels that the meds, spending time with her family and BF has helped. Explored skills she's using to manage thoughts that create more depression. She identifies using \"reality checking\". 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 Reports that she's having a lot of trouble getting out of the bed in the morning but once she's up she feels rested. She got on the blue list her last term at college.   Stresses: new job  Appetite: unremarkable  Sleep: " unremarkable  Therapy: Marilyn Ga with [facility]  EVE: No  Preg: No   Most important: meds are working well together, doing better        Progress on Treatment Objective(s) / Homework:  Satisfactory progress - ACTION (Actively working towards change); Intervened by reinforcing change plan / affirming steps taken    Motivational Interviewing    MI Intervention: Co-Developed Goal: maintain med compliance, Expressed Empathy/Understanding, Open-ended questions, Reflections: simple and complex and Change talk (evoked)     Change Talk Expressed by the Patient: Desire to change Ability to change Reasons to change Need to change Committment to change Activation Taking steps    Provider Response to Change Talk: E - Evoked more info from patient about behavior change, A - Affirmed patient's thoughts, decisions, or attempts at behavior change, R - Reflected patient's change talk and S - Summarized patient's change talk statements      Care Plan review completed: Yes    Medication Review:  No changes to current psychiatric medication(s)    Medication Compliance:  Yes    Changes in Health Issues:   None reported    Chemical Use Review:   Substance Use: Chemical use reviewed, no active concerns identified      Tobacco Use: No current tobacco use.      Assessment: Current Emotional / Mental Status (status of significant symptoms):  Risk status (Self / Other harm or suicidal ideation)  Patient denies a history of suicidal ideation, suicide attempts, self-injurious behavior, homicidal ideation, homicidal behavior and and other safety concerns  Patient denies current fears or concerns for personal safety.  Patient denies current or recent suicidal ideation or behaviors.  Patient denies current or recent homicidal ideation or behaviors.  Patient denies current or recent self injurious behavior or ideation.  Patient denies other safety concerns.  A safety and risk management plan has not been developed at this time, however patient was  encouraged to call [place] [phone] should there be a change in any of these risk factors.    Appearance:   Appropriate   Eye Contact:   Good   Psychomotor Behavior: Normal   Attitude:   Cooperative   Orientation:   All  Speech   Rate / Production: Normal    Volume:  Normal   Mood:    Normal  Affect:    Appropriate   Thought Content:  Clear   Thought Form:  Coherent  Logical   Insight:    Good     Diagnoses:  1. Depression, major, recurrent, mild (H)    2. Attention deficit hyperactivity disorder (ADHD), combined type        Collateral Reports Completed:  Collaborated with Usha Hobbs, MSN, APRN, CNP, FMHNP-BC, [place] [facility]    Plan: (Homework, other):  Patient was given information about behavioral services and encouraged to schedule a follow up appointment with the clinic [facility] in 1 month.  She was also given information about mental health symptoms and treatment options .  CD Recommendations: No indications of CD issues.  Saida Rodriguez MSW [facility]      ______________________________________________________________________    Integrated Primary Care Behavioral Health Treatment Plan    Patient's Name: Sanaz Lange  YOB: 2000    Date of Creation: 03/09/2022  Date Treatment Plan Last Reviewed/Revised: 6/9/2022    DSM5 Diagnoses: Attention-Deficit/Hyperactivity Disorder  314.00 (F90.0) Predominantly inattentive presentation, 296.32 (F33.1) Major Depressive Disorder, Recurrent Episode, Moderate _ and With anxious distress or 300.02 (F41.1) Generalized Anxiety Disorder  Psychosocial / Contextual Factors: none identified  PROMIS (reviewed every 90 days): 29    Referral / Collaboration:  Referral to another professional/service is not indicated at this time..    Anticipated number of session for this episode of care: 4-6  Anticipation frequency of session: Monthly  Anticipated Duration of each session: 16-37 minutes  Treatment plan will be reviewed in 90 days or when goals have been  changed.       MeasurableTreatment Goal(s) related to diagnosis / functional impairment(s)  Goal 1: Patient will experience improved depression    I will know I've met my goal when I have less feelings of helplessness and hopelessness.      Objective #A (Patient Action)    Patient will Decrease frequency and intensity of feeling down, depressed, hopeless.  Status: Continued - Date(s): 09/08/2022    Intervention(s)  Therapist will teach strategies to challenge destructive self talk.    Patient has reviewed and agreed to the above plan.      Saida Rodriguez, [facility]  June 28, 2022

## 2022-06-28 ENCOUNTER — VIRTUAL VISIT (OUTPATIENT)
Dept: BEHAVIORAL HEALTH | Facility: CLINIC | Age: 22
End: 2022-06-28
Payer: COMMERCIAL

## 2022-06-28 ENCOUNTER — VIRTUAL VISIT (OUTPATIENT)
Dept: PSYCHIATRY | Facility: CLINIC | Age: 22
End: 2022-06-28
Payer: COMMERCIAL

## 2022-06-28 DIAGNOSIS — F98.8 ATTENTION DEFICIT DISORDER, UNSPECIFIED HYPERACTIVITY PRESENCE: Primary | ICD-10-CM

## 2022-06-28 DIAGNOSIS — F90.2 ATTENTION DEFICIT HYPERACTIVITY DISORDER (ADHD), COMBINED TYPE: ICD-10-CM

## 2022-06-28 DIAGNOSIS — F41.9 ANXIETY: ICD-10-CM

## 2022-06-28 DIAGNOSIS — F32.0 MILD MAJOR DEPRESSION (H): ICD-10-CM

## 2022-06-28 DIAGNOSIS — F33.0 DEPRESSION, MAJOR, RECURRENT, MILD (H): Primary | ICD-10-CM

## 2022-06-28 PROCEDURE — 99214 OFFICE O/P EST MOD 30 MIN: CPT | Mod: GT | Performed by: NURSE PRACTITIONER

## 2022-06-28 PROCEDURE — 90832 PSYTX W PT 30 MINUTES: CPT | Mod: 95 | Performed by: SOCIAL WORKER

## 2022-06-28 RX ORDER — DEXTROAMPHETAMINE SACCHARATE, AMPHETAMINE ASPARTATE MONOHYDRATE, DEXTROAMPHETAMINE SULFATE AND AMPHETAMINE SULFATE 5; 5; 5; 5 MG/1; MG/1; MG/1; MG/1
20 CAPSULE, EXTENDED RELEASE ORAL DAILY
Qty: 30 CAPSULE | Refills: 0 | Status: SHIPPED | OUTPATIENT
Start: 2022-07-29 | End: 2022-08-28

## 2022-06-28 RX ORDER — VENLAFAXINE HYDROCHLORIDE 150 MG/1
150 CAPSULE, EXTENDED RELEASE ORAL DAILY
Qty: 90 CAPSULE | Refills: 0 | Status: SHIPPED | OUTPATIENT
Start: 2022-06-28 | End: 2023-01-11

## 2022-06-28 RX ORDER — DEXTROAMPHETAMINE SACCHARATE, AMPHETAMINE ASPARTATE MONOHYDRATE, DEXTROAMPHETAMINE SULFATE AND AMPHETAMINE SULFATE 5; 5; 5; 5 MG/1; MG/1; MG/1; MG/1
20 CAPSULE, EXTENDED RELEASE ORAL DAILY
Qty: 30 CAPSULE | Refills: 0 | Status: SHIPPED | OUTPATIENT
Start: 2022-06-28 | End: 2022-07-28

## 2022-06-28 RX ORDER — DEXTROAMPHETAMINE SACCHARATE, AMPHETAMINE ASPARTATE MONOHYDRATE, DEXTROAMPHETAMINE SULFATE AND AMPHETAMINE SULFATE 5; 5; 5; 5 MG/1; MG/1; MG/1; MG/1
20 CAPSULE, EXTENDED RELEASE ORAL DAILY
Qty: 30 CAPSULE | Refills: 0 | Status: SHIPPED | OUTPATIENT
Start: 2022-08-29 | End: 2022-09-28

## 2022-06-28 ASSESSMENT — PATIENT HEALTH QUESTIONNAIRE - PHQ9
SUM OF ALL RESPONSES TO PHQ QUESTIONS 1-9: 8
SUM OF ALL RESPONSES TO PHQ QUESTIONS 1-9: 8
10. IF YOU CHECKED OFF ANY PROBLEMS, HOW DIFFICULT HAVE THESE PROBLEMS MADE IT FOR YOU TO DO YOUR WORK, TAKE CARE OF THINGS AT HOME, OR GET ALONG WITH OTHER PEOPLE: SOMEWHAT DIFFICULT

## 2022-06-28 NOTE — PATIENT INSTRUCTIONS
**For crisis resources, please see the information at the end of this document**     Thank you for coming to the [facility] [place] [facility].    TREATMENT PLAN:    MEDICATIONS:   -The current medical regimen is effective; continue present plan and medications.   - Consider   Omega 3 Supplementation:  Omega 3's, which are healthy components of fish oil, krill oil, flaxseed oil, and other particular food substances, taken daily also has much data that indicates its benefits in good mental and physical health. Fish oil can improve cholesterol, cardiovascular and neurological health over long term, and has had evidence that it can help with various mental health issues including depression and ADHD.        CONSULTS/REFERRALS:   Continue therapy     LABS/PROCEDURES:   -None at this time        FOLLOW UP:   Thank you for our work together in the Psychiatry Collaborative Care Model at [facility]. This is our last visit and I am returning your care back to your Primary Care Provider Rosalind Pride PA-C . If you are not doing well, please contact your Primary Care Provider office.     Financial Assistance [phone]  in2apps Billing [phone]  Central Billing Office, [facility]: [phone]  [place] Billing [phone]  Medical Records [phone]  [place] Patient Bill of Rights [email or web address]       MENTAL HEALTH CRISIS RESOURCES:  For a emergency help, please call 911 or go to the nearest Emergency Department.     Emergency Walk-In Options:   EmPATH Unit @ [facility] ([place]): [phone] - Specialized mental health emergency area designed to be calming  [facility] ([place]): [phone]  [facility] ([place]): [phone]  [facility] ([place]): [phone]    [facility] Crisis Information:   [place]: [phone]  [place]:  [phone]  [place] ([place]) - Adult: [phone]     Child: [phone]  [place] - Adult: [phone]     Child: [phone]  [place]: [phone]  List of all [facility] resources:   https://[place].gov/dhs/people-we-serve/adults/health-care/mental-health/resources/crisis-contacts.jsp    National Crisis Information:   Crisis Text Line: Text  [place]  to 486330  National Suicide Prevention Lifeline: 6-435-567-TALK (1-660.836.6350)       For online chat options, visit https://suicidepreventionlifeline.org/chat/  Poison Control Center: 1-875.805.1746  Trans Lifeline: 1-386.163.1017 - Hotline for transgender people of all ages  The [employer]: 7-907-555-8361 - Hotline for LGBT youth     For Non-Emergency Support:   Fast Tracker: Mental Health & Substance Use Disorder Resources -   https://www.fasttrackermn.org/       Again thank you for choosing [facility] [place] [facility] and please let us know how we can best partner with you to improve you and your family's health.    You may be receiving a survey regarding this appointment. We would love to have your feedback, both positive and negative. The survey is done by an external company, so your answers are anonymous.

## 2022-06-28 NOTE — ASSESSMENT & PLAN NOTE
Psychiatrically stable at present. The Adderall XR 20 mg is effective in targeting executive functioning impairment (inability to focus, impaired organization and planning, reduced working memory).  In the future, can increase to as high as 60 mg daily (FDA recommended dosing).  Denies side effects at this dosage.  Will return to PCP for ongoing care, medication prescribing and future medication refills.   3 months of medications fills provided.  Follow up with Blessing Wadsworth in about  months. Patient can be re-referred back to this service for further consultation in the future if needed but a new referral will need to be placed.

## 2022-06-28 NOTE — PROGRESS NOTES
PSYCHIATRIC MEDICATION FOLLOW UP APPT     Name:  Sanaz Lange  : 2000    Sanaz Lange is a 21 year old female who is being evaluated via a billable video visit.      How would you like to obtain your AVS? MyChart  If the video visit is dropped, the invitation should be resent by: Text to cell phone: [phone]  Will anyone else be joining your video visit? No        Location [place] If not at home address below, please ask where they are in case of an emergency situation arises during the appointment.  [address] [address] [place] [address]    Telemedicine Visit: The patient's condition can be safely assessed and treated via synchronous audio and visual telemedicine encounter.      Reason for Telemedicine Visit: COVID 19 pandemic and the social and physical recommendations by the CDC and MD.      Originating Site (Patient Location): Patient's home    Distant Site (Provider Location): Provider Remote Setting    Consent:  The patient/guardian has verbally consented to: the potential risks and benefits of telemedicine (video visit or phone) versus in person care; bill my insurance or make self-payment for services provided; and responsibility for payment of non-covered services.     Mode of Communication:  Errund video platform     As the provider I attest to compliance with applicable laws and regulations related to telemedicine.    IDENTIFICATION   Referred by: Blessing Wadsworth PA-C [facility]   Therapist: Marilyn with [facility]     Patient attended the phone/video session alone.    Last seen for outpatient psychiatry Return Visit on 5/10/2022.      FOLLOWING PLAN PUT INTO PLACE: Mood is currently stable on the combination of venlafaxine and lamotrigine. The transition to Ritalin 20 mg IR was not effective in targeting attention and focus. No significant benefit from doubling the dose. Therefore will transition to the Adderall  family. Adderall XR 20 mg with a quantity of 14 sent to the pharmacy. She will update this provider via [employer] on the efficacy and tolerability and will then send a prescription for 30 additional days with a follow-up in 6 weeks       INTERIM HISTORY     COMMUNICATIONS FROM PATIENT VIA:  none    RECORDS AVAILABLE FOR REVIEW: EHR records through [employer] .  In addition, reviewed the assessment completed by Saida Rodriguez [facility], dated today       HISTORY OF PRESENT ILLNESS   [facility]PS referral for psychiatric medication consult in 10/07/2021.  Reports history of depression and ADHD.  Denies prior psychiatric hospitalizations. No history of suicidal thoughts or attempts. No history of self-injurious behaviors. Genetically loaded for  anxiety, depression and ADHD.. Grew up in an intact home with all basic needs being met.     Reports past diagnosis of depression and ADHD. First sought treatment in around 2018 in the context of depression.  Felt sad all the time, emotional, felt she was letting people down, couldn't focus during class.  She was started with therapy and anitdepressants.  Subsequently diagnosis with ADHD and when looking back at elementary, dyllan and high school years endorses long standing distractibility, restlessness, poor concentration, inattention, forgetfulness, need for repetition of instructions, and poor listening.       FAMILY, MEDICAL, SURGICAL HISTORY REVIEWED.  MEDICATION HAVE BEEN REVIEWED AND ARE CURRENT TO THE BEST OF MY KNOWLEDGE AND ABILITY.  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  Living with a roommate  Will start a job at a  for the summer,  for     MEDICATIONS                                                                                                Current Outpatient Medications   Medication Sig     amphetamine-dextroamphetamine (ADDERALL XR) 20 MG 24 hr capsule Take 1 capsule (20 mg) by mouth daily     Cetirizine HCl  "(ZYRTEC PO)      cholecalciferol (VITAMIN D3) 125 MCG (5000 UT) TABS tablet Take 1 tablet (5,000 Units) by mouth daily     hydrOXYzine (ATARAX) 25 MG tablet Take 1 tablet (25 mg) by mouth 2 times daily as needed for anxiety (or panic)     lamoTRIgine (LAMICTAL) 25 MG tablet Take 2 tablets (50 mg) by mouth daily     TRI-LO-SPRINTEC 0.18/0.215/0.25 MG-25 MCG tablet TAKE 1 TABLET BY MOUTH DAILY     venlafaxine (EFFEXOR XR) 150 MG 24 hr capsule Take 1 capsule (150 mg) by mouth daily     No current facility-administered medications for this visit.        NOTES ABOUT CURRENT PSYCHOTROPIC MEDICATIONS:   Venlafaxine  mg, no benefit from increase to 187.5 mg in 11/2021  Hydroxyzine 25 mg twice a day as needed anxiety, taking it 2 times at school  Lamotrigine 50 mg  Adderall XR 20 mg       Supplements: vitamin D. Omega Fish oil/omega-3, MV     PAST PSYCHOTROPIC MEDICATIONS:  Fluoxetine   Citalopram   Methylphenidate LA 20 mg not effective  Ritalin 20 IR 20 mg twice a day       TODAY PATIENT REPORTS THE FOLLOWING PSYCHIATRIC ROS:   Per [facility], Saida Rodriguez, during today's team-based visit:  \"MH update: Pt reports that her attention and depression symptoms have improved. She feels that the meds, spending time with her family and BF has helped. Explored skills she's using to manage thoughts that create more depression. She identifies using \"reality checking\". 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 Reports that she's having a lot of trouble getting out of the bed in the morning but once she's up she feels rested. She got on the blue list her last term at college.   Stresses: new job  Appetite: unremarkable  Sleep: unremarkable  Therapy: Marilyn Ga with [facility]  EVE: No  Preg: No   Most important: meds are working well together, doing better\"     EXERCISE: Adequate  SIDE EFFECTS:   tolerating medications without reported side " "effects  SUBSTANCE USE:  Denies   COMPLIANCE:   states Adherent to medication regimen  REPORTS THE FOLLOWING NEW MEDICAL ISSUES:   none    PROBLEM: DEPRESSION: Feels the current medication regimen is effective.     Suicidal ideation: Denies       Last PHQ-9 6/28/2022   1.  Little interest or pleasure in doing things 2   2.  Feeling down, depressed, or hopeless 1   3.  Trouble falling or staying asleep, or sleeping too much 1   4.  Feeling tired or having little energy 1   5.  Poor appetite or overeating 1   6.  Feeling bad about yourself 1   7.  Trouble concentrating 1   8.  Moving slowly or restless 0   Q9: Thoughts of better off dead/self-harm past 2 weeks 0   PHQ-9 Total Score 8   Difficulty at work, home, or with people -   In the past two weeks have you had thoughts of suicide or self harm? -   Do you have concerns about your personal safety or the safety of others? -     PHQ-9 SCORE 3/2/2022 4/14/2022 6/28/2022   PHQ-9 Total Score MyChart 10 (Moderate depression) - 8 (Mild depression)   PHQ-9 Total Score 10 9 8   PHQ-A Total Score - - -     PHQ9 score is 8 indicating mild depression.   Suicidal ideation:  No     PROBLEM: ANXIETY: Feels the current medication regimen is effective..   GAD7 score is Not completed today  JUANJOSE-7 SCORE 10/27/2021 1/18/2022 4/14/2022   Total Score - 13 (moderate anxiety) -   Total Score 12 13 9       PROBLEM: ADHD: Feels the current medication regimen is effective..       PERTINENT PAST MEDICAL AND SURGICAL HISTORY     Past Medical History:   Diagnosis Date     Hypovitaminosis D 9/19/2019     Other specified viral warts 4/2/2015       VITALS     BP Readings from Last 1 Encounters:   07/07/21 110/70     Pulse Readings from Last 1 Encounters:   07/07/21 100     Wt Readings from Last 1 Encounters:   07/07/21 78 kg (172 lb) (93 %, Z= 1.46)*     * Growth percentiles are based on [facility] (Girls, 2-20 Years) data.     Ht Readings from Last 1 Encounters:   07/07/21 1.638 m (5' 4.5\") (53 %, Z= " "0.09)*     * Growth percentiles are based on [facility] (Girls, 2-20 Years) data.     Estimated body mass index is 29.07 kg/m  as calculated from the following:    Height as of 7/7/21: 1.638 m (5' 4.5\").    Weight as of 7/7/21: 78 kg (172 lb).    LABS & IMAGING                                                                                                                   Recent Labs   Lab Test 03/18/21  1532 06/04/19  1134   WBC 7.3 6.9   HGB 12.6 11.8   HCT 38.5 36.2   MCV 90 84    281   ANEU  --  3.7     Recent Labs   Lab Test 03/18/21  1532      POTASSIUM 4.2   CHLORIDE 107   CO2 27   *   RENUKA 9.3   BUN 9   CR 0.80   GFRESTIMATED >90   ALBUMIN 3.9   PROTTOTAL 8.1   AST 24   ALT 28   ALKPHOS 49   BILITOTAL 0.1*     No lab results found.  Recent Labs   Lab Test 07/07/21  0919   TSH 1.37   T4 0.86        ALLERGY & IMMUNIZATIONS     No Known Allergies    MEDICAL REVIEW OF SYSTEMS:   Ten system review was completed with pertinent positives noted     MENTAL STATUS EXAM:   General/Constitutional:  Appearance:   awake, alert, adequately groomed, appeared stated age and no apparent distress  Attitude:    cooperative   Eye Contact:  good  Musculoskeletal:  Psychomotor Behavior:  no evidence of tardive dyskinesia, dystonia, or tics from the head up  Psychiatric:  Speech:  clear, coherent, regular rate, rhythm, and volume,   No pressure speech noted.  Associations:  no loose associations  Thought Process:   logical, linear and goal oriented  Thought Content:    No evidence of suicidal ideation or homicidal ideation, no evidence of psychotic thought, no auditory hallucinations present and no visual hallucinations present  Mood:  good     Affect:  full range/stable (normal variation of emotions during exam) and was congruent to speech content.  Insight:  good  Judgment:  intact, adequate for safety  Impulse Control:  intact  Neurological:  Oriented to:  person, place, time, and situation  Attention Span and " Concentration:  normal    Language: intact     Recent and Remote Memory:  Intact to interview. Not formally assessed. No amnesia.    Fund of Knowledge: appropriate        SAFETY   Feels safe in home: Yes   Suicidal ideation: Denies  History of suicide attempts:  No   Hx of impulsivity: No   Hope for the future: present    Hx of Command hallucinations or current psychosis: No  History of Self-injurious behaviors: No Current:  No  Family member  by suicide:  No     SAFETY ASSESSMENT:   Based on all available evidence including the factors cited above, overall Risk for harm is low and is appropriate for outpatient level of care.   Recommended that patient call 911 or go to the local ED should there be a change in any of these risk factors.    DSM 5 DIAGNOSIS:   Attention-Deficit/Hyperactivity Disorder  314.00 (F90.0) Predominantly inattentive presentation  296.31 (F33.0) Major Depressive Disorder, Recurrent Episode, Mild _    MEDICAL COMORBIDITY IMPACTING CLINICAL PICTURE: None noted  ASSESSMENT AND PLAN      Problem List Items Addressed This Visit        Behavioral     Psychiatrically stable at present. The Adderall XR 20 mg is effective in targeting executive functioning impairment (inability to focus, impaired organization and planning, reduced working memory).  In the future, can increase to as high as 60 mg daily (FDA recommended dosing).  Denies side effects at this dosage.  Will return to PCP for ongoing care, medication prescribing and future medication refills.   3 months of medications fills provided.  Follow up with Blessing Wadsworth in about  months. Patient can be re-referred back to this service for further consultation in the future if needed but a new referral will need to be placed.                   Attention deficit disorder, unspecified hyperactivity presence - Primary    Relevant Medications    amphetamine-dextroamphetamine (ADDERALL XR) 20 MG 24 hr capsule    amphetamine-dextroamphetamine  (ADDERALL XR) 20 MG 24 hr capsule (Start on 7/29/2022)    amphetamine-dextroamphetamine (ADDERALL XR) 20 MG 24 hr capsule (Start on 8/29/2022)    Mild major depression (H)    Relevant Medications    venlafaxine (EFFEXOR XR) 150 MG 24 hr capsule       Other    Anxiety    Relevant Medications    venlafaxine (EFFEXOR XR) 150 MG 24 hr capsule           CONSULTS/REFERRALS:   Continue therapy   Coordinate care with therapist as needed     MEDICAL:   None at this time  Coordinate care with PCP (Blessing Wadsworth) as needed  Follow up with primary care provider as planned or for acute medical concerns.     PSYCHOEDUCATION:  Medication side effects and alternatives reviewed. Health promotion activities recommended and reviewed today. All questions addressed. Education and counseling completed regarding risks and benefits of medications and psychotherapy options.  Consent provided by patient/guardian  Call the psychiatric nurse line with medication questions or concerns at [phone].  Humounohart may be used to communicate with your provider, but this is not intended to be used for emergencies.  BLACK BOX WARNING: Discussed the Food and Drug Administration (FDA) requires that all antidepressants carry a warning that some children, adolescents and young adults may be at increased risk of suicide when taking antidepressants. Anyone taking an antidepressant should be watched closely for worsening depression or unusual behavior especially in the first few weeks after starting an SSRI. Keep in mind, antidepressants are more likely to reduce suicide risk in the long run by improving mood.   STIMULANT THERAPY: Side effects discussed including but not limited to cardiac (including HTN, tachycardia, sudden death), motor/tic, appetite/growth, mood lability and sleep disruption. This is a controlled substance with risk for abuse, need to keep in a safe keep place and cannot replace lost scripts  Medlineplus.gov is information for  patients.  It is run by the [employer] and it contains information about all disorders, diseases and all medications.       COMMUNITY RESOURCES:    CRISIS NUMBERS: Provided in AVS 10/7/2021  National Suicide Prevention Lifeline: 3-408-969-TALK (519-953-1251)  ChickRx/resources for a list of additional resources (SOS)            [facility] - [phone]   Urgent Care Adult Mental Mtxbjp-[phone] mobile unit/  crisis line  [facility] -[phone]   COPE  [place] Mobile Team -[phone] (adults)/ [phone] (child)  Poison Control Center - 1-675.203.3389    OR  go to nearest ER  Crisis Text Line for any crisis  send this-   To: 649828   [facility] ([facility]) [facility]  [phone]  National Suicide Prevention Lifeline: 472.885.2546 (TTY: 134.994.1088). Call anytime for help.  (www.suicidepreventionlifeline.org)  National [employer] on Mental Illness (www.sallie.org): 644-887-1142 or 776-175-5458.   Mental Health Association (www.mentalhealth.org): 451.599.9584 or 071-838-4674.  [place] Crisis Text Line: Text [place] to 555573  Suicide LifeLine Chat: suicideBe Great Partners.org/chat      ADMINISTRATIVE BILLIN minutes spent interviewing patient, reviewing referral documents, obtaining and reviewing outside records, communication with other health specialists, and preparing this report.    Video/Phone Start Time:  1700  Video/Phone End Time:  5:19 PM    Greater than 50% of time was spent in counseling and coordination of care regarding above diagnoses and treatment plan.    Patient Status:  Patient will continue to be seen for ongoing consultation and stabilization.    Signed:   Usha Hobbs, MSN, APRN, FMHNP-[facility] ([facility])   Chart documentation done in part with Dragon Voice Recognition software.  Although reviewed after completion, some word and grammatical errors may  remain.

## 2022-06-28 NOTE — Clinical Note
The patient is being returned to the referring provider for ongoing care and medication prescribing.  The patient can be referred back to this service for further consultation as needed.  Please make a note should they call to schedule for follow-up.  Thank you!  Usha Hobbs, MSN, APRN, CNP, HNP-BC,  [facility]

## 2022-07-20 NOTE — MR AVS SNAPSHOT
After Visit Summary   3/10/2017    Sanaz Lange    MRN: [id number]           Patient Information     Date Of Birth          2000        Visit Information        Provider Department      3/10/2017 9:00 AM Estefanía Zhu MD [facility]        Today's Diagnoses     Hives    -  1    Traveler's diarrhea          Care Instructions      Hives (Adult)  Hives are pink or red bumps on the skin. These bumps are also known as  wheals.  The bumps can itch, burn, or sting. Hives can occur anywhere on the body. They vary in size and shape and can form in clusters. Individual hives can appear and go away quickly. New hives may develop as old ones fade. Hives are common and usually harmless. Occasionally hives are a sign of a serious allergy.  Hives are often caused by an allergic reaction. It may be an allergic reaction to foods such as fruit, shellfish, chocolate, nuts, or tomatoes. It may be a reaction to pollens, animal fur, or mold spores. Medications, chemicals, and insect bites can also cause hives. And hives can be caused by hot sun or cold air. The cause of hives can be difficult to find.  You may be given medications to relieve swelling and itching. Follow all instructions when using these medications. The hives will fade in a few days, but can last up to 2 weeks.  Home care  Follow these tips:    Try to find the cause of the hives and eliminate it. Discuss possible causes with your health care provider. Future reactions to the same allergen may be worse.    Don t scratch the hives. Scratching will delay healing. To reduce itching, apply cool, wet compresses to the skin.    Dress in soft, loose cotton clothing.    Don t bathe in hot water. This can make the itching worse.    Apply an ice pack or cool pack wrapped in a thin towel to your skin. This will help reduce redness and itching.    Try a topical spray or cream with benzocaine to help reduce itching.    Use  Update History & Physical    The Patient's History and Physical attached was reviewed with the patient and I examined the patient. There is no change. The surgical site was confirmed by the patient and me. Plan:  The risk, benefits, expected outcome, and alternative to the recommended procedure have been discussed with Suzi David. She understands and wants to proceed with the procedure. oral diphenhydramine to help reduce itching. This is an antihistamine you can buy at drug and grocery stores. It can make you sleepy, so use lower doses during the daytime. Or you can use loratadine. This is an antihistamine that will not make you sleepy. Don t use diphenhydramine if you have glaucoma or have trouble urinating because of an enlarged prostate.  Follow-up care  Follow up with your health care provider if your symptoms don't get better in 2 days. Ask your provider about allergy testing if you have had a severe reaction, or have had several episodes of hives. He or she can use the allergy testing to find out what you are allergic to.  When to seek medical advice  Call your health care provider right away if any of these occur:    Fever of 100.4 F (38.0 C) or higher    Swelling of the face, throat, or tongue    Trouble breathing or swallowing    Redness, swelling, or pain    Foul-smelling fluid coming from the rash    Dizziness, weakness, or fainting    [id number] [employer]. [address] [place] [address]. All rights reserved. This information is not intended as a substitute for professional medical care. Always follow your healthcare professional's instructions.              Follow-ups after your visit        Who to contact     If you have questions or need follow up information about today's clinic visit or your schedule please contact [facility] directly at [phone].  Normal or non-critical lab and imaging results will be communicated to you by MyChart, letter or phone within 4 business days after the clinic has received the results. If you do not hear from us within 7 days, please contact the clinic through MyChart or phone. If you have a critical or abnormal lab result, we will notify you by phone as soon as possible.  Submit refill requests through [employer] or call your pharmacy and they will forward the refill request to us. Please allow 3 business  "days for your refill to be completed.          Additional Information About Your Visit        MyChart Information     [employer] lets you send messages to your doctor, view your test results, renew your prescriptions, schedule appointments and more. To sign up, go to [email or web address], contact your [place] clinic or call [phone] during business hours.            Care EveryWhere ID     This is your Care EveryWhere ID. This could be used by other organizations to access your [place] medical records  [id number]        Your Vitals Were     Pulse Temperature Height Last Period Pulse Oximetry BMI (Body Mass Index)    93 97.5  F (36.4  C) (Tympanic) 5' 5\" (1.651 m) 02/24/2017 100% 24.43 kg/m2       Blood Pressure from Last 3 Encounters:   03/10/17 100/68   10/02/16 110/60   03/03/16 116/78    Weight from Last 3 Encounters:   03/10/17 146 lb 12.8 oz (66.6 kg) (88 %)*   10/02/16 147 lb (66.7 kg) (90 %)*   03/03/16 151 lb 3.2 oz (68.6 kg) (93 %)*     * Growth percentiles are based on CDC 2-20 Years data.              Today, you had the following     No orders found for display         Today's Medication Changes          These changes are accurate as of: 3/10/17  9:26 AM.  If you have any questions, ask your nurse or doctor.               Start taking these medicines.        Dose/Directions    ciprofloxacin 750 MG tablet   Commonly known as:  CIPRO   Used for:  Traveler's diarrhea   Started by:  Estefanía Zhu MD        Dose:  750 mg   Take 1 tablet (750 mg) by mouth once for 1 dose   Quantity:  2 tablet   Refills:  0            Where to get your medicines      These medications were sent to [facility] [place] - [place], [place] - [address]  [address], [place] [address]     Phone:  [phone]     ciprofloxacin 750 MG tablet                Primary Care Provider Office Phone # Fax #    Blessing Wadsworth PA-C [phone]       [facility] " [address] [address]        Thank you!     Thank you for choosing [facility] [place]  for your care. Our goal is always to provide you with excellent care. Hearing back from our patients is one way we can continue to improve our services. Please take a few minutes to complete the written survey that you may receive in the mail after your visit with us. Thank you!             Your Updated Medication List - Protect others around you: Learn how to safely use, store and throw away your medicines at www.disposemymeds.org.          This list is accurate as of: 3/10/17  9:26 AM.  Always use your most recent med list.                   Brand Name Dispense Instructions for use    ciprofloxacin 750 MG tablet    CIPRO    2 tablet    Take 1 tablet (750 mg) by mouth once for 1 dose       IBUPROFEN PO

## 2022-08-24 NOTE — PROGRESS NOTES
"Pediatrics Pulmonary - Provider Note  Asthma - Return Visit    Patient: Sanaz Lange MRN# [id number]   Encounter: Aug 20, 2019  : 2000        I [facility] at the [facility] for a asthma follow-up accompanied by mother.    Subjective:   HPI: Sanaz was last seen in clinic on 2019, at which time we documented airway hyperresponsiveness, which made asthma possible, but did not prove that caused her exertional dyspnea.  Since then, she has taken Qvar 1 puff BID & \"if it made any difference, it was small one\".  She continues to experience exertional dyspnea with long walks or with dance, which she describes as central chest pain.  She does not sleep well, but no cough or snoring overnight.  She has prolonged sleep latency, & denies respiratory Sx.  Mother says her mind races & Sanaz says she thinks about \"everything under the sun\".  She continues to report orthopnea.      Allergies  Allergies as of 2019     (No Known Allergies)     Current Outpatient Medications   Medication Sig Dispense Refill     albuterol (PROAIR HFA/PROVENTIL HFA/VENTOLIN HFA) 108 (90 Base) MCG/ACT inhaler Inhale 2 puffs into the lungs every 6 hours as needed for shortness of breath / dyspnea or wheezing 18 g 3     beclomethasone HFA (QVAR REDIHALER) 80 MCG/ACT inhaler Inhale 1 puff into the lungs 2 times daily 1 Inhaler 3     IBUPROFEN PO          Past medical history, surgical history and family history reviewed with patient/parent today, no changes.      RoS  A comprehensive review of systems was performed and is negative except as noted in the HPI and her dizziness is occuring more often..  This occurs after being upright for many minutes.  She must focus on something in her visual field to obtain relief.  She reports palpitations when she has exertional dyspnea; but recently also reported slowing heart, perhaps dropping beats, as she lays down at night.    Objective:     Physical Exam  BP (!) 129/90 (BP " "Location: Right arm, Patient Position: Sitting, Cuff Size: Adult Regular)   Pulse 75   Temp 98.2  F (36.8  C) (Oral)   Resp 16   Ht 5' 5.16\" (165.5 cm)   Wt 161 lb 13.1 oz (73.4 kg)   SpO2 97%   BMI 26.80 kg/m    Ht Readings from Last 2 Encounters:   08/20/19 5' 5.16\" (165.5 cm) (65 %)*   06/04/19 5' 5.08\" (165.3 cm) (64 %)*     * Growth percentiles are based on CDC (Girls, 2-20 Years) data.     Wt Readings from Last 2 Encounters:   08/20/19 161 lb 13.1 oz (73.4 kg) (91 %)*   06/04/19 154 lb 8.7 oz (70.1 kg) (88 %)*     * Growth percentiles are based on CDC (Girls, 2-20 Years) data.     BMI %: > 36 months -  90 %ile based on CDC (Girls, 2-20 Years) BMI-for-age based on body measurements available as of 8/20/2019.    Constitutional:  No distress, comfortable, pleasant.  Vital signs:  Reviewed and normal apart from mild hypertension.  Cardiovascular:   Regular rate and rhythm, no murmurs, rubs or gallops, peripheral pulses full and symmetric.  Chest:  Symmetrical, no retractions.  Respiratory:  Clear to auscultation, no wheezes or crackles, normal breath sounds.  Musculoskeletal:  Full range of motion, no clubbing.  Skin:  No concerning lesions, no eczema.  Neurological:  Normal gait without focal deficits.    Results for orders placed or performed in visit on 08/20/19   General PFT Lab (Please always keep checked)   Result Value Ref Range    FVC-Pred 3.87 L    FVC-Pre 4.59 L    FVC-%Pred-Pre 118 %    FEV1-Pre 3.89 L    FEV1-%Pred-Pre 113 %    FEV1FVC-Pred 89 %    FEV1FVC-Pre 85 %    FEFMax-Pred 6.92 L/sec    FEFMax-Pre 8.21 L/sec    FEFMax-%Pred-Pre 118 %    FEF2575-Pred 4.04 L/sec    FEF2575-Pre 4.15 L/sec    AWS8817-%Pred-Pre 102 %    FEF2575-Post 4.99 L/sec    ZEZ3977-%Pred-Post 123 %    ExpTime-Pre 4.71 sec    FIFMax-Pre 4.50 L/sec    FEV1FEV6-Pred 87 %    FEV1FEV6-Pre 85 %     Spirometry Interpretation:  Spirometry today normal with no bronchodilator response.  FeNO normal for the second time [including " prior to starting ICS].    Laboratory Investigation:  Her allergy blood work drawn after the last visit was normal/negative.    Assessment     I was not convinced asthma was the cause of her chest discomfort and the lack of response to ICS is consistent with thi diagnostic uncertainty.s      Plan:     I will schedule a maximal cardiopulmonary exercise test for her with measurement of tidal flow volume loops, during which we will also monitor twelve-lead ECG.  If there are any suspicions arising from the latter I will certainly refer her to cardiology.  Mom available Mon-Tues next; otherwise Sanaz can come on her own.  Follow-up with Dr Case TBASHLEY.     Please call the pulmonary nurse line ([phone]) with questions, concerns and prescription refill requests during business hours.     For urgent concerns after hours and on the weekends, please contact the on call pulmonologist ([phone]).    Guru Case MD (Paul), FRCP(C)  Professor of Pediatrics  Division of Pediatric Pulmonary & Sleep Medicine  [facility]      CC  GURU CASE T    Copy to patient  MELINDA MULTANI   [address] [phone]    This note completed with voice recognition software. It was proof-read but may still contain errors. If ambiguities, please contact me for clarification.             Burow's Advancement Flap Text: The defect edges were debeveled with a #15 scalpel blade.  Given the location of the defect and the proximity to free margins a Burow's advancement flap was deemed most appropriate.  Using a sterile surgical marker, the appropriate advancement flap was drawn incorporating the defect and placing the expected incisions within the relaxed skin tension lines where possible.    The area thus outlined was incised deep to adipose tissue with a #15 scalpel blade.  The skin margins were undermined to an appropriate distance in all directions utilizing iris scissors.

## 2022-10-11 ENCOUNTER — VIRTUAL VISIT (OUTPATIENT)
Dept: PSYCHOLOGY | Facility: CLINIC | Age: 22
End: 2022-10-11
Payer: COMMERCIAL

## 2022-10-11 DIAGNOSIS — F41.1 GENERALIZED ANXIETY DISORDER: ICD-10-CM

## 2022-10-11 DIAGNOSIS — F33.1 MAJOR DEPRESSIVE DISORDER, RECURRENT EPISODE, MODERATE (H): Primary | ICD-10-CM

## 2022-10-11 DIAGNOSIS — F90.2 ATTENTION DEFICIT HYPERACTIVITY DISORDER, COMBINED TYPE: ICD-10-CM

## 2022-10-11 PROCEDURE — 90834 PSYTX W PT 45 MINUTES: CPT | Mod: 95 | Performed by: COUNSELOR

## 2022-10-11 ASSESSMENT — PATIENT HEALTH QUESTIONNAIRE - PHQ9
SUM OF ALL RESPONSES TO PHQ QUESTIONS 1-9: 12
SUM OF ALL RESPONSES TO PHQ QUESTIONS 1-9: 12
10. IF YOU CHECKED OFF ANY PROBLEMS, HOW DIFFICULT HAVE THESE PROBLEMS MADE IT FOR YOU TO DO YOUR WORK, TAKE CARE OF THINGS AT HOME, OR GET ALONG WITH OTHER PEOPLE: SOMEWHAT DIFFICULT

## 2022-10-11 NOTE — PROGRESS NOTES
[facility]                                     Progress Note    Patient Name: Sanaz Lange  Date: 10/11/2022         Service Type: Individual      Session Start Time: 3:02pm  Session End Time: 3:47pm     Session Length: 45 mins     Session #: 1 for this episode of care     Attendees: Client    Service Modality:  Video Visit:      Provider verified identity through the following two step process.  Patient provided:  Patient is known previously to provider    Telemedicine Visit: The patient's condition can be safely assessed and treated via synchronous audio and visual telemedicine encounter.      Reason for Telemedicine Visit: Services only offered telehealth    Originating Site (Patient Location): Patient's home    Distant Site (Provider Location): Provider Remote Setting- Home Office    Consent:  The patient/guardian has verbally consented to: the potential risks and benefits of telemedicine (video visit) versus in person care; bill my insurance or make self-payment for services provided; and responsibility for payment of non-covered services.     Patient would like the video invitation sent by:  My Chart    Mode of Communication:  Video Conference via [employer]    As the provider I attest to compliance with applicable laws and regulations related to telemedicine.    DATA  Interactive Complexity: No  Crisis: No        Progress Since Last Session (Related to Symptoms / Goals / Homework):   Symptoms: No change      Homework: last session was 4/28      Episode of Care Goals: Minimal progress - PREPARATION (Decided to change - considering how); Intervened by negotiating a change plan and determining options / strategies for behavior change, identifying triggers, exploring social supports, and working towards setting a date to begin behavior change     Current / Ongoing Stressors and Concerns:   Long distance relationship   School work   Sleep concerns      Treatment Objective(s) Addressed in  This Session:   use daily planner 100% of the time       Intervention:   Emotion Focused Therapy: processing the last 5 months   Solution Focused: organization of homework     Assessments completed prior to visit:  The following assessments were completed by patient for this visit:  PHQ9:   PHQ-9 SCORE 10/7/2021 10/27/2021 1/18/2022 3/2/2022 4/14/2022 6/28/2022 10/11/2022   PHQ-9 Total Score MyChart 13 (Moderate depression) - 10 (Moderate depression) 10 (Moderate depression) - 8 (Mild depression) 12 (Moderate depression)   PHQ-9 Total Score 13 12 10 10 9 8 12   PHQ-A Total Score - - - - - - -     GAD7:   JUANJOSE-7 SCORE 7/7/2021 8/5/2021 9/21/2021 10/7/2021 10/27/2021 1/18/2022 4/14/2022   Total Score 15 (severe anxiety) - - 16 (severe anxiety) - 13 (moderate anxiety) -   Total Score 15 14 13 16 12 13 9         ASSESSMENT: Current Emotional / Mental Status (status of significant symptoms):   Risk status (Self / Other harm or suicidal ideation)   Patient denies current fears or concerns for personal safety.   Patient denies current or recent suicidal ideation or behaviors.   Patient denies current or recent homicidal ideation or behaviors.   Patient denies current or recent self injurious behavior or ideation.   Patient denies other safety concerns.   Patient reports there has been no change in risk factors since their last session.     Patient reports there has been no change in protective factors since their last session.     A safety and risk management plan has been developed including: Patient has no change in safety concerns. Committed to safety and agreed to follow previously developed safety plan..  Patient consented to co-developed safety plan.  Safety and risk management plan was completed.  Patient agreed to use safety plan should any safety concerns arise.  A copy was given to the patient.     Appearance:   Appropriate    Eye Contact:   Good    Psychomotor Behavior: Normal    Attitude:   Cooperative   Pleasant   Orientation:   All   Speech    Rate / Production: Normal     Volume:  Normal    Mood:    Normal   Affect:    Appropriate    Thought Content:  Clear    Thought Form:  Coherent  Logical    Insight:    Fair      Medication Review:   No changes to current psychiatric medication(s)     Medication Compliance:   Yes     Changes in Health Issues:   None reported     Chemical Use Review:   Substance Use: Chemical use reviewed, no active concerns identified      Tobacco Use: No current tobacco use.      Diagnosis:  1. Major depressive disorder, recurrent episode, moderate (H)    2. Attention deficit hyperactivity disorder, combined type    3. Generalized anxiety disorder        Collateral Reports Completed:   Not Applicable    PLAN: (Patient Tasks / Therapist Tasks / Other)  Provider assigned client to think about goals for this episode of care   Provider assigned client to reach out to doctor about sleep quality concerns         Marilyn Ga [facility] 10/11/2022                                                           ______________________________________________________________________    Individual Treatment Plan    Patient's Name: Sanaz Lange  YOB: 2000    Date of Creation: 10/11/2022  Date Treatment Plan Last Reviewed/Revised: 10/11/2022    DSM5 Diagnoses:   1. Major depressive disorder, recurrent episode, moderate (H)    2. Attention deficit hyperactivity disorder, combined type    3. Generalized anxiety disorder      Psychosocial / Contextual Factors: college, peer relationships, organization, emotional regulation  PROMIS (reviewed every 90 days):     Referral / Collaboration:  Referral to another professional/service is not indicated at this time.    Anticipated number of session for this episode of care: 3-6 sessions  Anticipation frequency of session: Monthly  Anticipated Duration of each session: 38-52 minutes  Treatment plan will be reviewed in 90 days or when goals have been  changed.       Answers for HPI/ROS submitted by the patient on 10/11/2022  If you checked off any problems, how difficult have these problems made it for you to do your work, take care of things at home, or get along with other people?: Somewhat difficult  PHQ9 TOTAL SCORE: 12

## 2022-11-15 ENCOUNTER — VIRTUAL VISIT (OUTPATIENT)
Dept: PSYCHOLOGY | Facility: CLINIC | Age: 22
End: 2022-11-15
Payer: COMMERCIAL

## 2022-11-15 DIAGNOSIS — F41.1 GENERALIZED ANXIETY DISORDER: ICD-10-CM

## 2022-11-15 DIAGNOSIS — F33.1 MAJOR DEPRESSIVE DISORDER, RECURRENT EPISODE, MODERATE (H): Primary | ICD-10-CM

## 2022-11-15 DIAGNOSIS — F90.2 ATTENTION DEFICIT HYPERACTIVITY DISORDER, COMBINED TYPE: ICD-10-CM

## 2022-11-15 PROCEDURE — 90834 PSYTX W PT 45 MINUTES: CPT | Mod: 95 | Performed by: COUNSELOR

## 2022-11-15 NOTE — PROGRESS NOTES
[facility]                                     Progress Note    Patient Name: Sanaz Lange  Date: 11/15/2022         Service Type: Individual      Session Start Time: 3:00pm  Session End Time: 3:40pm     Session Length: 40 mins     Session #: 2    Attendees: Client attended alone    Service Modality:  Video Visit:      Provider verified identity through the following two step process.  Patient provided:  Patient is known previously to provider    Telemedicine Visit: The patient's condition can be safely assessed and treated via synchronous audio and visual telemedicine encounter.      Reason for Telemedicine Visit: Services only offered telehealth    Originating Site (Patient Location): Patient's home    Distant Site (Provider Location): Provider Remote Setting- Home Office    Consent:  The patient/guardian has verbally consented to: the potential risks and benefits of telemedicine (video visit) versus in person care; bill my insurance or make self-payment for services provided; and responsibility for payment of non-covered services.     Patient would like the video invitation sent by:  My Chart    Mode of Communication:  Video Conference via Amwell    Distant Location (Provider):  Off-site    As the provider I attest to compliance with applicable laws and regulations related to telemedicine.    DATA  Interactive Complexity: No  Crisis: No        Progress Since Last Session (Related to Symptoms / Goals / Homework):   Symptoms: No change still dealing with daytime sleepiness, not taking a couple of her meds but reports no change in mood or attention     Homework: Partially completed      Episode of Care Goals: new goals this session      Current / Ongoing Stressors and Concerns:   Time management, sleepiness, consistency in schedule      Treatment Objective(s) Addressed in This Session:   use daily planner 100% of the time  creating SMART goals        Intervention:   Motivational  Interviewing    MI Intervention: Permission to raise concern or advise, Open-ended questions and Change talk (evoked)     Change Talk Expressed by the Patient: Ability to change    Provider Response to Change Talk: E - Evoked more info from patient about behavior change and S - Summarized patient's change talk statements    Solution Focused: goal setting     Assessments completed prior to visit:  The following assessments were completed by patient for this visit:  PHQ9:   PHQ-9 SCORE 10/7/2021 10/27/2021 1/18/2022 3/2/2022 4/14/2022 6/28/2022 10/11/2022   PHQ-9 Total Score MyChart 13 (Moderate depression) - 10 (Moderate depression) 10 (Moderate depression) - 8 (Mild depression) 12 (Moderate depression)   PHQ-9 Total Score 13 12 10 10 9 8 12   PHQ-A Total Score - - - - - - -     GAD7:   JUANJOSE-7 SCORE 7/7/2021 8/5/2021 9/21/2021 10/7/2021 10/27/2021 1/18/2022 4/14/2022   Total Score 15 (severe anxiety) - - 16 (severe anxiety) - 13 (moderate anxiety) -   Total Score 15 14 13 16 12 13 9         ASSESSMENT: Current Emotional / Mental Status (status of significant symptoms):   Risk status (Self / Other harm or suicidal ideation)   Patient denies current fears or concerns for personal safety.   Patient denies current or recent suicidal ideation or behaviors.   Patient denies current or recent homicidal ideation or behaviors.   Patient denies current or recent self injurious behavior or ideation.   Patient denies other safety concerns.   Patient reports there has been no change in risk factors since their last session.     Patient reports there has been no change in protective factors since their last session.     A safety and risk management plan has been developed including: Patient denied any current/recent/lifetime history of suicidal ideation and/or behaviors.  No safety plan indicated at this time. .  Patient consented to co-developed safety plan.  Safety and risk management plan was completed.  Patient agreed to use  safety plan should any safety concerns arise.  A copy was given to the patient.     Appearance:   Appropriate    Eye Contact:   Good    Psychomotor Behavior: Normal    Attitude:   Cooperative  Pleasant   Orientation:   All   Speech    Rate / Production: Normal     Volume:  Normal    Mood:    Normal   Affect:    Appropriate    Thought Content:  Clear    Thought Form:  Coherent    Insight:    Fair      Medication Review:   No changes to current psychiatric medication(s)     Medication Compliance:   No stated she is out of one med and DR did not refill it      Changes in Health Issues:   Yes: Sleep disturbance, Associated Psychological Distress     Chemical Use Review:   Substance Use: Chemical use reviewed, no active concerns identified      Tobacco Use: No current tobacco use.      Diagnosis:  1. Major depressive disorder, recurrent episode, moderate (H)    2. Attention deficit hyperactivity disorder, combined type    3. Generalized anxiety disorder        Collateral Reports Completed:   Not Applicable    PLAN: (Patient Tasks / Therapist Tasks / Other)  Provider assigned client to reach out to her PCP about meds, supplement and sleepiness  Provider assigned client to work on morning goals         Marilyn Ga [facility] 11/15/2022       Morning routine- wake up with alarm, eating breakfast, get ready (do hair)                                                    ______________________________________________________________________     Individual Treatment Plan     Patient's Name: Sanaz Lange                 YOB: 2000     Date of Creation: 10/11/2022  Date Treatment Plan Last Reviewed/Revised: 10/11/2022     DSM5 Diagnoses:   1. Major depressive disorder, recurrent episode, moderate (H)    2. Attention deficit hyperactivity disorder, combined type    3. Generalized anxiety disorder       Psychosocial / Contextual Factors: college, peer relationships, organization, emotional regulation  PROMIS  (reviewed every 90 days):      Referral / Collaboration:  Referral to another professional/service is not indicated at this time.     Anticipated number of session for this episode of care: 3-6 sessions  Anticipation frequency of session: Monthly  Anticipated Duration of each session: 38-52 minutes  Treatment plan will be reviewed in 90 days or when goals have been changed.         Answers for HPI/ROS submitted by the patient on 10/11/2022  If you checked off any problems, how difficult have these problems made it for you to do your work, take care of things at home, or get along with other people?: Somewhat difficult  PHQ9 TOTAL SCORE: 12     MeasurableTreatment Goal(s) related to diagnosis / functional impairment(s)  Goal 1: Patient will report follow through with short term goals and therapy homework assignments     I will know I've met my goal when I have made progress on figuring out daytime sleepiness. Working on overall health (following a schedule in morning or evening) also including exercise.      Objective #A (Patient Action)    Patient will set alarm for twenty mins earlier each morning   Status: New - Date: 11/15/22     Intervention(s)  Therapist will teach client problem solving skills when she encounters barriers .    Objective #B  Patient will wake once a week at desired time.  Status: New - Date: 11/15/22     Intervention(s)  Therapist will assist client in creating small SMART goals related to larger general goals .    Objective #C  Patient will be proactive in contacting doctor about sleep concerns   Status: New - Date: 11/15/22     Intervention(s)  Therapist will assist client in reaching out to provider if necessary .      Patient has reviewed and agreed to the above plan.      Marilyn Ga [facility]  November 15, 2022

## 2022-11-20 ENCOUNTER — HEALTH MAINTENANCE LETTER (OUTPATIENT)
Age: 22
End: 2022-11-20

## 2022-11-21 ENCOUNTER — MYC REFILL (OUTPATIENT)
Dept: PSYCHIATRY | Facility: CLINIC | Age: 22
End: 2022-11-21

## 2022-11-21 NOTE — TELEPHONE ENCOUNTER
"Refill request r'cd from [email or web address] via fax for Venlafaxine 150 mg denied due to no longer under provider care. Faxed \"not authorized\" back to pharmacy.    6/28/22 provider last visit note:  FOLLOW UP:   Thank you for our work together in the Psychiatry Collaborative Care Model at [facility]. This is our last visit and I am returning your care back to your Primary Care Provider Rosalind Pride PA-C . If you are not doing well, please contact your Primary Care Provider office.      RN sent [employer] message      Roseann Hassan RN November 21, 2022 12:41 PM    "

## 2022-12-06 ENCOUNTER — VIRTUAL VISIT (OUTPATIENT)
Dept: PSYCHOLOGY | Facility: CLINIC | Age: 22
End: 2022-12-06
Payer: COMMERCIAL

## 2022-12-06 DIAGNOSIS — F90.2 ATTENTION DEFICIT HYPERACTIVITY DISORDER, COMBINED TYPE: ICD-10-CM

## 2022-12-06 DIAGNOSIS — F41.1 GENERALIZED ANXIETY DISORDER: ICD-10-CM

## 2022-12-06 DIAGNOSIS — F33.1 MAJOR DEPRESSIVE DISORDER, RECURRENT EPISODE, MODERATE (H): Primary | ICD-10-CM

## 2022-12-06 PROCEDURE — 90834 PSYTX W PT 45 MINUTES: CPT | Mod: 95 | Performed by: COUNSELOR

## 2022-12-06 ASSESSMENT — ANXIETY QUESTIONNAIRES
IF YOU CHECKED OFF ANY PROBLEMS ON THIS QUESTIONNAIRE, HOW DIFFICULT HAVE THESE PROBLEMS MADE IT FOR YOU TO DO YOUR WORK, TAKE CARE OF THINGS AT HOME, OR GET ALONG WITH OTHER PEOPLE: SOMEWHAT DIFFICULT
8. IF YOU CHECKED OFF ANY PROBLEMS, HOW DIFFICULT HAVE THESE MADE IT FOR YOU TO DO YOUR WORK, TAKE CARE OF THINGS AT HOME, OR GET ALONG WITH OTHER PEOPLE?: SOMEWHAT DIFFICULT
GAD7 TOTAL SCORE: 11
5. BEING SO RESTLESS THAT IT IS HARD TO SIT STILL: MORE THAN HALF THE DAYS
4. TROUBLE RELAXING: SEVERAL DAYS
7. FEELING AFRAID AS IF SOMETHING AWFUL MIGHT HAPPEN: SEVERAL DAYS
3. WORRYING TOO MUCH ABOUT DIFFERENT THINGS: MORE THAN HALF THE DAYS
2. NOT BEING ABLE TO STOP OR CONTROL WORRYING: MORE THAN HALF THE DAYS
6. BECOMING EASILY ANNOYED OR IRRITABLE: MORE THAN HALF THE DAYS
GAD7 TOTAL SCORE: 11
1. FEELING NERVOUS, ANXIOUS, OR ON EDGE: SEVERAL DAYS
7. FEELING AFRAID AS IF SOMETHING AWFUL MIGHT HAPPEN: SEVERAL DAYS
GAD7 TOTAL SCORE: 11

## 2022-12-06 ASSESSMENT — PATIENT HEALTH QUESTIONNAIRE - PHQ9
SUM OF ALL RESPONSES TO PHQ QUESTIONS 1-9: 9
SUM OF ALL RESPONSES TO PHQ QUESTIONS 1-9: 9
10. IF YOU CHECKED OFF ANY PROBLEMS, HOW DIFFICULT HAVE THESE PROBLEMS MADE IT FOR YOU TO DO YOUR WORK, TAKE CARE OF THINGS AT HOME, OR GET ALONG WITH OTHER PEOPLE: SOMEWHAT DIFFICULT

## 2022-12-06 NOTE — PROGRESS NOTES
[facility]                                     Progress Note    Patient Name: Sanaz Lange  Date: 12/6/2022         Service Type: Individual      Session Start Time: 9:02am  Session End Time: 9:43am     Session Length: 41 mins     Session #: 3    Attendees: Client attended alone    Service Modality:  Video Visit:      Provider verified identity through the following two step process.  Patient provided:  Patient is known previously to provider    Telemedicine Visit: The patient's condition can be safely assessed and treated via synchronous audio and visual telemedicine encounter.      Reason for Telemedicine Visit: Services only offered telehealth    Originating Site (Patient Location): Patient's home    Distant Site (Provider Location): Provider Remote Setting- Home Office    Consent:  The patient/guardian has verbally consented to: the potential risks and benefits of telemedicine (video visit) versus in person care; bill my insurance or make self-payment for services provided; and responsibility for payment of non-covered services.     Patient would like the video invitation sent by:  My Chart    Mode of Communication:  Video Conference via Amwell    Distant Location (Provider):  Off-site    As the provider I attest to compliance with applicable laws and regulations related to telemedicine.    DATA  Interactive Complexity: No  Crisis: No        Progress Since Last Session (Related to Symptoms / Goals / Homework):   Symptoms: No change      Homework: Partially completed      Episode of Care Goals: Minimal progress - PREPARATION (Decided to change - considering how); Intervened by negotiating a change plan and determining options / strategies for behavior change, identifying triggers, exploring social supports, and working towards setting a date to begin behavior change     Current / Ongoing Stressors and Concerns:   Large group anxiety/sensory experiences, behavioral issues at work       Treatment Objective(s) Addressed in This Session:   use cognitive strategies identified in therapy to challenge anxious thoughts  Identify negative self-talk and behaviors: challenge core beliefs, myths, and actions       Intervention:   CBT: reframing  Emotion Focused Therapy: processing emotions related to work  Solution Focused: large group anxiety    Assessments completed prior to visit:  The following assessments were completed by patient for this visit:  PHQ9:   PHQ-9 SCORE 10/27/2021 1/18/2022 3/2/2022 4/14/2022 6/28/2022 10/11/2022 12/6/2022   PHQ-9 Total Score MyChart - 10 (Moderate depression) 10 (Moderate depression) - 8 (Mild depression) 12 (Moderate depression) 9 (Mild depression)   PHQ-9 Total Score 12 10 10 9 8 12 9   PHQ-A Total Score - - - - - - -     GAD7:   JUANJOSE-7 SCORE 8/5/2021 9/21/2021 10/7/2021 10/27/2021 1/18/2022 4/14/2022 12/6/2022   Total Score - - 16 (severe anxiety) - 13 (moderate anxiety) - 11 (moderate anxiety)   Total Score 14 13 16 12 13 9 11         ASSESSMENT: Current Emotional / Mental Status (status of significant symptoms):   Risk status (Self / Other harm or suicidal ideation)   Patient denies current fears or concerns for personal safety.   Patient denies current or recent suicidal ideation or behaviors.   Patient denies current or recent homicidal ideation or behaviors.   Patient denies current or recent self injurious behavior or ideation.   Patient denies other safety concerns.   Patient reports there has been no change in risk factors since their last session.     Patient reports there has been no change in protective factors since their last session.     A safety and risk management plan has been developed including: Patient has no change in safety concerns. Committed to safety and agreed to follow previously developed safety plan..  Patient consented to co-developed safety plan.  Safety and risk management plan was completed.  Patient agreed to use safety plan should  any safety concerns arise.  A copy was given to the patient.     Appearance:   Appropriate    Eye Contact:   Good    Psychomotor Behavior: Normal    Attitude:   Cooperative    Orientation:   All   Speech    Rate / Production: Emotional    Volume:  Normal    Mood:    Anxious  Sad    Affect:    Tearful   Thought Content:  Rumination    Thought Form:  Coherent  Logical    Insight:    Fair      Medication Review:   Changes to psychiatric medications, see updated Medication List in EPIC.      Medication Compliance:   No doesnt take lamitcal     Changes in Health Issues:   Yes: Sleep disturbance, Associated Psychological Distress     Chemical Use Review:   Substance Use: Chemical use reviewed, no active concerns identified      Tobacco Use: No current tobacco use.      Diagnosis:  1. Major depressive disorder, recurrent episode, moderate (H)    2. Attention deficit hyperactivity disorder, combined type    3. Generalized anxiety disorder        Collateral Reports Completed:   Not Applicable    PLAN: (Patient Tasks / Therapist Tasks / Other)  Provider assigned client to schedule with PCP  Provider assigned client to reframe thoughts related to discipline at her work         Marilyn [facility] 12/6/2022                                                           ______________________________________________________________________     Individual Treatment Plan     Patient's Name: Sanaz Lange                 YOB: 2000     Date of Creation: 10/11/2022  Date Treatment Plan Last Reviewed/Revised: 10/11/2022     DSM5 Diagnoses:   1. Major depressive disorder, recurrent episode, moderate (H)    2. Attention deficit hyperactivity disorder, combined type    3. Generalized anxiety disorder       Psychosocial / Contextual Factors: college, peer relationships, organization, emotional regulation  PROMIS (reviewed every 90 days):      Referral / Collaboration:  Referral to another professional/service is not  indicated at this time.     Anticipated number of session for this episode of care: 3-6 sessions  Anticipation frequency of session: Monthly  Anticipated Duration of each session: 38-52 minutes  Treatment plan will be reviewed in 90 days or when goals have been changed.         Answers for HPI/ROS submitted by the patient on 10/11/2022  If you checked off any problems, how difficult have these problems made it for you to do your work, take care of things at home, or get along with other people?: Somewhat difficult  PHQ9 TOTAL SCORE: 12     MeasurableTreatment Goal(s) related to diagnosis / functional impairment(s)  Goal 1: Patient will report follow through with short term goals and therapy homework assignments     I will know I've met my goal when I have made progress on figuring out daytime sleepiness. Working on overall health (following a schedule in morning or evening) also including exercise.       Objective #A (Patient Action)                          Patient will set alarm for twenty mins earlier each morning   Status: New - Date: 11/15/22      Intervention(s)  Therapist will teach client problem solving skills when she encounters barriers .     Objective #B  Patient will wake once a week at desired time.  Status: New - Date: 11/15/22      Intervention(s)  Therapist will assist client in creating small SMART goals related to larger general goals .     Objective #C  Patient will be proactive in contacting doctor about sleep concerns   Status: New - Date: 11/15/22      Intervention(s)  Therapist will assist client in reaching out to provider if necessary .        Patient has reviewed and agreed to the above plan.        Marilyn Ga [facility]                       November 15, 2022      Answers for HPI/ROS submitted by the patient on 12/6/2022  If you checked off any problems, how difficult have these problems made it for you to do your work, take care of things at home, or get along with other people?: Somewhat  difficult  PHQ9 TOTAL SCORE: 9  JUANJOSE 7 TOTAL SCORE: 11

## 2022-12-27 ENCOUNTER — VIRTUAL VISIT (OUTPATIENT)
Dept: PSYCHOLOGY | Facility: CLINIC | Age: 22
End: 2022-12-27
Payer: COMMERCIAL

## 2022-12-27 DIAGNOSIS — F90.2 ATTENTION DEFICIT HYPERACTIVITY DISORDER, COMBINED TYPE: ICD-10-CM

## 2022-12-27 DIAGNOSIS — F41.1 GENERALIZED ANXIETY DISORDER: ICD-10-CM

## 2022-12-27 DIAGNOSIS — F33.1 MAJOR DEPRESSIVE DISORDER, RECURRENT EPISODE, MODERATE (H): Primary | ICD-10-CM

## 2022-12-27 PROCEDURE — 90834 PSYTX W PT 45 MINUTES: CPT | Mod: 95 | Performed by: COUNSELOR

## 2022-12-27 NOTE — PROGRESS NOTES
[facility]                                     Progress Note    Patient Name: Sanaz Lange  Date: 12/27/2022         Service Type: Individual      Session Start Time: 3:02pm  Session End Time: 3:45pm     Session Length: 43 mins     Session #: 4    Attendees: Client attended alone    Service Modality:  Video Visit:      Provider verified identity through the following two step process.  Patient provided:  Patient is known previously to provider    Telemedicine Visit: The patient's condition can be safely assessed and treated via synchronous audio and visual telemedicine encounter.      Reason for Telemedicine Visit: Services only offered telehealth    Originating Site (Patient Location): Patient's home    Distant Site (Provider Location): Provider Remote Setting- Home Office    Consent:  The patient/guardian has verbally consented to: the potential risks and benefits of telemedicine (video visit) versus in person care; bill my insurance or make self-payment for services provided; and responsibility for payment of non-covered services.     Patient would like the video invitation sent by:  My Chart    Mode of Communication:  Video Conference via Amwell    Distant Location (Provider):  Off-site    As the provider I attest to compliance with applicable laws and regulations related to telemedicine.    DATA  Interactive Complexity: No  Crisis: No        Progress Since Last Session (Related to Symptoms / Goals / Homework):   Symptoms: No change      Homework: Achieved / completed to satisfaction      Episode of Care Goals: Minimal progress - ACTION (Actively working towards change); Intervened by reinforcing change plan / affirming steps taken     Current / Ongoing Stressors and Concerns:   Work, burn out, home for break, roommate relationships      Treatment Objective(s) Addressed in This Session:   self care/stress  Burn out     Intervention:   Emotion Focused Therapy: supportive  listening  Solution Focused: self care/stress relief     Assessments completed prior to visit:  The following assessments were completed by patient for this visit:  PHQ9:   PHQ-9 SCORE 10/27/2021 1/18/2022 3/2/2022 4/14/2022 6/28/2022 10/11/2022 12/6/2022   PHQ-9 Total Score MyChart - 10 (Moderate depression) 10 (Moderate depression) - 8 (Mild depression) 12 (Moderate depression) 9 (Mild depression)   PHQ-9 Total Score 12 10 10 9 8 12 9   PHQ-A Total Score - - - - - - -     GAD7:   JUANJOSE-7 SCORE 8/5/2021 9/21/2021 10/7/2021 10/27/2021 1/18/2022 4/14/2022 12/6/2022   Total Score - - 16 (severe anxiety) - 13 (moderate anxiety) - 11 (moderate anxiety)   Total Score 14 13 16 12 13 9 11         ASSESSMENT: Current Emotional / Mental Status (status of significant symptoms):   Risk status (Self / Other harm or suicidal ideation)   Patient denies current fears or concerns for personal safety.   Patient denies current or recent suicidal ideation or behaviors.   Patient denies current or recent homicidal ideation or behaviors.   Patient denies current or recent self injurious behavior or ideation.   Patient denies other safety concerns.   Patient reports there has been no change in risk factors since their last session.     Patient reports there has been no change in protective factors since their last session.     A safety and risk management plan has been developed including: Patient has no change in safety concerns. Committed to safety and agreed to follow previously developed safety plan..  Patient consented to co-developed safety plan.  Safety and risk management plan was completed.  Patient agreed to use safety plan should any safety concerns arise.  A copy was given to the patient.     Appearance:   Appropriate    Eye Contact:   Good    Psychomotor Behavior: Normal    Attitude:   Cooperative    Orientation:   All   Speech    Rate / Production: Normal     Volume:  Normal    Mood:    Normal   Affect:    Appropriate     Thought Content:  Rumination    Thought Form:  Coherent  Logical    Insight:    Fair      Medication Review:   Changes to psychiatric medications, see updated Medication List in EPIC.      Medication Compliance:   No not taking lamictal      Changes in Health Issues:   None reported     Chemical Use Review:   Substance Use: Chemical use reviewed, no active concerns identified      Tobacco Use: No current tobacco use.      Diagnosis:  1. Major depressive disorder, recurrent episode, moderate (H)    2. Attention deficit hyperactivity disorder, combined type    3. Generalized anxiety disorder        Collateral Reports Completed:   Not Applicable    PLAN: (Patient Tasks / Therapist Tasks / Other)  Provider assigned client client to work on stress relief after work   Provider assigned client to end her work day same time daily         Marilyn Ga, [facility] 12/27/2022                                                           ______________________________________________________________________     Individual Treatment Plan     Patient's Name: Sanaz Lange                 YOB: 2000     Date of Creation: 10/11/2022  Date Treatment Plan Last Reviewed/Revised: 10/11/2022     DSM5 Diagnoses:   1. Major depressive disorder, recurrent episode, moderate (H)    2. Attention deficit hyperactivity disorder, combined type    3. Generalized anxiety disorder       Psychosocial / Contextual Factors: college, peer relationships, organization, emotional regulation  PROMIS (reviewed every 90 days):      Referral / Collaboration:  Referral to another professional/service is not indicated at this time.     Anticipated number of session for this episode of care: 3-6 sessions  Anticipation frequency of session: Monthly  Anticipated Duration of each session: 38-52 minutes  Treatment plan will be reviewed in 90 days or when goals have been changed.         Answers for HPI/ROS submitted by the patient on 10/11/2022  If  you checked off any problems, how difficult have these problems made it for you to do your work, take care of things at home, or get along with other people?: Somewhat difficult  PHQ9 TOTAL SCORE: 12     MeasurableTreatment Goal(s) related to diagnosis / functional impairment(s)  Goal 1: Patient will report follow through with short term goals and therapy homework assignments     I will know I've met my goal when I have made progress on figuring out daytime sleepiness. Working on overall health (following a schedule in morning or evening) also including exercise.       Objective #A (Patient Action)                          Patient will set alarm for twenty mins earlier each morning   Status: New - Date: 11/15/22      Intervention(s)  Therapist will teach client problem solving skills when she encounters barriers .     Objective #B  Patient will wake once a week at desired time.  Status: New - Date: 11/15/22      Intervention(s)  Therapist will assist client in creating small SMART goals related to larger general goals .     Objective #C  Patient will be proactive in contacting doctor about sleep concerns   Status: New - Date: 11/15/22      Intervention(s)  Therapist will assist client in reaching out to provider if necessary .        Patient has reviewed and agreed to the above plan.        Marilyn Ga, [facility]                       November 15, 2022

## 2023-01-11 ENCOUNTER — OFFICE VISIT (OUTPATIENT)
Dept: FAMILY MEDICINE | Facility: CLINIC | Age: 23
End: 2023-01-11
Payer: COMMERCIAL

## 2023-01-11 VITALS
BODY MASS INDEX: 27.66 KG/M2 | RESPIRATION RATE: 18 BRPM | HEART RATE: 106 BPM | HEIGHT: 65 IN | TEMPERATURE: 96.3 F | OXYGEN SATURATION: 95 % | WEIGHT: 166 LBS | DIASTOLIC BLOOD PRESSURE: 80 MMHG | SYSTOLIC BLOOD PRESSURE: 110 MMHG

## 2023-01-11 DIAGNOSIS — F33.0 MILD RECURRENT MAJOR DEPRESSION (H): ICD-10-CM

## 2023-01-11 DIAGNOSIS — Z30.41 ENCOUNTER FOR SURVEILLANCE OF CONTRACEPTIVE PILLS: ICD-10-CM

## 2023-01-11 DIAGNOSIS — Z79.899 CONTROLLED SUBSTANCE AGREEMENT SIGNED: ICD-10-CM

## 2023-01-11 DIAGNOSIS — Z13.29 SCREENING FOR THYROID DISORDER: ICD-10-CM

## 2023-01-11 DIAGNOSIS — Z00.00 ROUTINE GENERAL MEDICAL EXAMINATION AT A HEALTH CARE FACILITY: Primary | ICD-10-CM

## 2023-01-11 DIAGNOSIS — F98.8 ATTENTION DEFICIT DISORDER, UNSPECIFIED HYPERACTIVITY PRESENCE: ICD-10-CM

## 2023-01-11 DIAGNOSIS — Z11.59 NEED FOR HEPATITIS C SCREENING TEST: ICD-10-CM

## 2023-01-11 DIAGNOSIS — Z79.899 ENCOUNTER FOR LONG-TERM (CURRENT) USE OF MEDICATIONS: ICD-10-CM

## 2023-01-11 DIAGNOSIS — Z13.0 SCREENING FOR DEFICIENCY ANEMIA: ICD-10-CM

## 2023-01-11 DIAGNOSIS — Z11.4 SCREENING FOR HIV (HUMAN IMMUNODEFICIENCY VIRUS): ICD-10-CM

## 2023-01-11 DIAGNOSIS — B00.1 COLD SORE: ICD-10-CM

## 2023-01-11 DIAGNOSIS — Z11.3 SCREENING FOR STDS (SEXUALLY TRANSMITTED DISEASES): ICD-10-CM

## 2023-01-11 DIAGNOSIS — E55.9 HYPOVITAMINOSIS D: ICD-10-CM

## 2023-01-11 DIAGNOSIS — F41.9 ANXIETY: ICD-10-CM

## 2023-01-11 LAB
ALBUMIN SERPL BCG-MCNC: 4.4 G/DL (ref 3.5–5.2)
ALP SERPL-CCNC: 67 U/L (ref 35–104)
ALT SERPL W P-5'-P-CCNC: 16 U/L (ref 10–35)
AMPHETAMINES UR QL: NOT DETECTED
ANION GAP SERPL CALCULATED.3IONS-SCNC: 11 MMOL/L (ref 7–15)
AST SERPL W P-5'-P-CCNC: 22 U/L (ref 10–35)
BARBITURATES UR QL SCN: NOT DETECTED
BENZODIAZ UR QL SCN: NOT DETECTED
BILIRUB SERPL-MCNC: 0.2 MG/DL
BUN SERPL-MCNC: 10.6 MG/DL (ref 6–20)
BUPRENORPHINE UR QL: NOT DETECTED
CALCIUM SERPL-MCNC: 10.2 MG/DL (ref 8.6–10)
CANNABINOIDS UR QL: NOT DETECTED
CHLORIDE SERPL-SCNC: 103 MMOL/L (ref 98–107)
COCAINE UR QL SCN: NOT DETECTED
CREAT SERPL-MCNC: 0.71 MG/DL (ref 0.51–0.95)
D-METHAMPHET UR QL: NOT DETECTED
DEPRECATED HCO3 PLAS-SCNC: 24 MMOL/L (ref 22–29)
ERYTHROCYTE [DISTWIDTH] IN BLOOD BY AUTOMATED COUNT: 13.1 % (ref 10–15)
GFR SERPL CREATININE-BSD FRML MDRD: >90 ML/MIN/1.73M2
GLUCOSE SERPL-MCNC: 88 MG/DL (ref 70–99)
HCT VFR BLD AUTO: 39.5 % (ref 35–47)
HGB BLD-MCNC: 13.1 G/DL (ref 11.7–15.7)
MCH RBC QN AUTO: 29.2 PG (ref 26.5–33)
MCHC RBC AUTO-ENTMCNC: 33.2 G/DL (ref 31.5–36.5)
MCV RBC AUTO: 88 FL (ref 78–100)
METHADONE UR QL SCN: NOT DETECTED
OPIATES UR QL SCN: NOT DETECTED
OXYCODONE UR QL SCN: NOT DETECTED
PCP UR QL SCN: NOT DETECTED
PLATELET # BLD AUTO: 382 10E3/UL (ref 150–450)
POTASSIUM SERPL-SCNC: 5.3 MMOL/L (ref 3.4–5.3)
PROPOXYPH UR QL: NOT DETECTED
PROT SERPL-MCNC: 7.7 G/DL (ref 6.4–8.3)
RBC # BLD AUTO: 4.48 10E6/UL (ref 3.8–5.2)
SODIUM SERPL-SCNC: 138 MMOL/L (ref 136–145)
TRICYCLICS UR QL SCN: NOT DETECTED
TSH SERPL DL<=0.005 MIU/L-ACNC: 1.29 UIU/ML (ref 0.3–4.2)
WBC # BLD AUTO: 7.3 10E3/UL (ref 4–11)

## 2023-01-11 PROCEDURE — 36415 COLL VENOUS BLD VENIPUNCTURE: CPT | Performed by: NURSE PRACTITIONER

## 2023-01-11 PROCEDURE — 84443 ASSAY THYROID STIM HORMONE: CPT | Performed by: NURSE PRACTITIONER

## 2023-01-11 PROCEDURE — 99214 OFFICE O/P EST MOD 30 MIN: CPT | Mod: 25 | Performed by: NURSE PRACTITIONER

## 2023-01-11 PROCEDURE — 99395 PREV VISIT EST AGE 18-39: CPT | Mod: 25 | Performed by: NURSE PRACTITIONER

## 2023-01-11 PROCEDURE — 87389 HIV-1 AG W/HIV-1&-2 AB AG IA: CPT | Performed by: NURSE PRACTITIONER

## 2023-01-11 PROCEDURE — 86803 HEPATITIS C AB TEST: CPT | Performed by: NURSE PRACTITIONER

## 2023-01-11 PROCEDURE — 90686 IIV4 VACC NO PRSV 0.5 ML IM: CPT | Performed by: NURSE PRACTITIONER

## 2023-01-11 PROCEDURE — 96127 BRIEF EMOTIONAL/BEHAV ASSMT: CPT | Performed by: NURSE PRACTITIONER

## 2023-01-11 PROCEDURE — 82306 VITAMIN D 25 HYDROXY: CPT | Performed by: NURSE PRACTITIONER

## 2023-01-11 PROCEDURE — 85027 COMPLETE CBC AUTOMATED: CPT | Performed by: NURSE PRACTITIONER

## 2023-01-11 PROCEDURE — 80306 DRUG TEST PRSMV INSTRMNT: CPT | Performed by: NURSE PRACTITIONER

## 2023-01-11 PROCEDURE — 80053 COMPREHEN METABOLIC PANEL: CPT | Performed by: NURSE PRACTITIONER

## 2023-01-11 PROCEDURE — 90471 IMMUNIZATION ADMIN: CPT | Performed by: NURSE PRACTITIONER

## 2023-01-11 RX ORDER — NORGESTIMATE AND ETHINYL ESTRADIOL 7DAYSX3 LO
1 KIT ORAL DAILY
Qty: 84 TABLET | Refills: 3 | Status: SHIPPED | OUTPATIENT
Start: 2023-01-11 | End: 2023-05-05

## 2023-01-11 RX ORDER — DEXTROAMPHETAMINE SACCHARATE, AMPHETAMINE ASPARTATE MONOHYDRATE, DEXTROAMPHETAMINE SULFATE AND AMPHETAMINE SULFATE 5; 5; 5; 5 MG/1; MG/1; MG/1; MG/1
20 CAPSULE, EXTENDED RELEASE ORAL DAILY
Qty: 30 CAPSULE | Refills: 0 | Status: SHIPPED | OUTPATIENT
Start: 2023-02-11 | End: 2023-03-13

## 2023-01-11 RX ORDER — DEXTROAMPHETAMINE SACCHARATE, AMPHETAMINE ASPARTATE MONOHYDRATE, DEXTROAMPHETAMINE SULFATE AND AMPHETAMINE SULFATE 5; 5; 5; 5 MG/1; MG/1; MG/1; MG/1
20 CAPSULE, EXTENDED RELEASE ORAL DAILY
Qty: 30 CAPSULE | Refills: 0 | Status: SHIPPED | OUTPATIENT
Start: 2023-01-11 | End: 2023-02-10

## 2023-01-11 RX ORDER — DEXTROAMPHETAMINE SACCHARATE, AMPHETAMINE ASPARTATE MONOHYDRATE, DEXTROAMPHETAMINE SULFATE AND AMPHETAMINE SULFATE 5; 5; 5; 5 MG/1; MG/1; MG/1; MG/1
20 CAPSULE, EXTENDED RELEASE ORAL DAILY
Qty: 30 CAPSULE | Refills: 0 | Status: SHIPPED | OUTPATIENT
Start: 2023-03-14 | End: 2023-04-13

## 2023-01-11 RX ORDER — HYDROXYZINE HYDROCHLORIDE 25 MG/1
25 TABLET, FILM COATED ORAL 2 TIMES DAILY PRN
Qty: 30 TABLET | Refills: 5 | Status: SHIPPED | OUTPATIENT
Start: 2023-01-11 | End: 2024-05-03

## 2023-01-11 RX ORDER — GALCANEZUMAB 120 MG/ML
120 INJECTION, SOLUTION SUBCUTANEOUS
COMMUNITY
Start: 2022-06-15 | End: 2023-08-21

## 2023-01-11 RX ORDER — VALACYCLOVIR HYDROCHLORIDE 1 G/1
2000 TABLET, FILM COATED ORAL 2 TIMES DAILY
Qty: 12 TABLET | Refills: 11 | Status: SHIPPED | OUTPATIENT
Start: 2023-01-11 | End: 2024-01-22

## 2023-01-11 RX ORDER — LAMOTRIGINE 25 MG/1
50 TABLET ORAL DAILY
Qty: 180 TABLET | Refills: 1 | Status: SHIPPED | OUTPATIENT
Start: 2023-01-11 | End: 2023-08-16

## 2023-01-11 RX ORDER — VENLAFAXINE HYDROCHLORIDE 150 MG/1
150 CAPSULE, EXTENDED RELEASE ORAL DAILY
Qty: 90 CAPSULE | Refills: 1 | Status: SHIPPED | OUTPATIENT
Start: 2023-01-11 | End: 2023-08-01

## 2023-01-11 RX ORDER — DEXTROAMPHETAMINE SACCHARATE, AMPHETAMINE ASPARTATE MONOHYDRATE, DEXTROAMPHETAMINE SULFATE AND AMPHETAMINE SULFATE 5; 5; 5; 5 MG/1; MG/1; MG/1; MG/1
20 CAPSULE, EXTENDED RELEASE ORAL DAILY
Qty: 30 CAPSULE | Refills: 0 | Status: CANCELLED | OUTPATIENT
Start: 2023-01-11

## 2023-01-11 ASSESSMENT — ENCOUNTER SYMPTOMS
DIZZINESS: 0
FEVER: 0
PARESTHESIAS: 0
HEADACHES: 0
NAUSEA: 0
CHILLS: 0
SHORTNESS OF BREATH: 0
PALPITATIONS: 0
EYE PAIN: 0
NERVOUS/ANXIOUS: 0
SORE THROAT: 0
MYALGIAS: 0
COUGH: 0
DYSURIA: 0
FREQUENCY: 0
ARTHRALGIAS: 0
ABDOMINAL PAIN: 0
JOINT SWELLING: 0
CONSTIPATION: 0
HEMATOCHEZIA: 0
WEAKNESS: 0
HEMATURIA: 0
HEARTBURN: 0
BREAST MASS: 0
DIARRHEA: 0

## 2023-01-11 ASSESSMENT — PATIENT HEALTH QUESTIONNAIRE - PHQ9
10. IF YOU CHECKED OFF ANY PROBLEMS, HOW DIFFICULT HAVE THESE PROBLEMS MADE IT FOR YOU TO DO YOUR WORK, TAKE CARE OF THINGS AT HOME, OR GET ALONG WITH OTHER PEOPLE: SOMEWHAT DIFFICULT
SUM OF ALL RESPONSES TO PHQ QUESTIONS 1-9: 12
SUM OF ALL RESPONSES TO PHQ QUESTIONS 1-9: 12

## 2023-01-11 NOTE — LETTER
[facility] [place]  01/11/23  Patient: Sanaz Lange  YOB: 2000  Medical Record Number: [id number]                                                                                  Non-Opioid Controlled Substance Agreement    This is an agreement between you and your provider regarding safe and appropriate use of controlled substances prescribed by your care team. Controlled substances are?medicines that can cause physical and mental dependence (abuse).     There are strict laws about having and using these medicines. We here at [facility] are  committed to working with you in your efforts to get better. To support you in this work, we'll help you schedule regular office appointments for medicine refills. If we must cancel or change your appointment for any reason, we'll make sure you have enough medicine to last until your next appointment.     As a Provider, I will:     Listen carefully to your concerns while treating you with respect.     Recommend a treatment plan that I believe is in your best interest and may involve therapies other than medicine.      Talk with you often about the possible benefits and the risk of harm of any medicine that we prescribe for you.    Assess the safety of this medicine and check how well it works.      Provide a plan on how to taper (discontinue or go off) using this medicine if the decision is made to stop its use.      ::  As a Patient, I understand controlled substances:       Are prescribed by my care provider to help me function or work and manage my condition(s).?    Are strong medicines and can cause serious side effects.       Need to be taken exactly as prescribed.?Combining controlled substances with certain medicines or chemicals (such as illegal drugs, alcohol, sedatives, sleeping pills, and benzodiazepines) can be dangerous or even fatal.? If I stop taking my medicines suddenly, I may have severe withdrawal symptoms.      The risks, benefits, and side effects of these medicine(s) were explained to me. I agree that:    1. I will take part in other treatments as advised by my care team. This may be psychiatry or counseling, physical therapy, behavioral therapy, group treatment or a referral to specialist.    2. I will keep all my appointments and understand this is part of the monitoring of controlled substances.?My care team may require an office visit for EVERY controlled substance refill. If I miss appointments or don t follow instructions, my care team may stop my medicine    3. I will take my medicines as prescribed. I will not change the dose or schedule unless my care team tells me to. There will be no refills if I run out early.      4. I may be asked to come to the clinic and complete a urine drug test or complete a pill count. If I don t give a urine sample or participate in a pill count, the care team may stop my medicine.    5. I will only receive controlled substance prescriptions from this clinic. If I am treated by another provider, I will tell them that I am taking controlled substances and that I have a treatment agreement with this provider. I will inform my [facility] care team within one business day if I am given a prescription for any controlled substance by another healthcare provider. My [facility] care team can contact other providers and pharmacists about my use of any medicines.    6. It is up to me to make sure that I don't run out of my medicines on weekends or holidays.?If my care team is willing to refill my prescription without a visit, I must request refills only during office hours. Refills may take up to 3 business days to process. I will use one pharmacy to fill all my controlled substance prescriptions. I will notify the clinic about any changes to my insurance or medicine availability.    7. I am responsible for my prescriptions. If the medicine/prescription is lost, stolen or  destroyed, it will not be replaced.?I also agree not to share controlled substance medicines with anyone.     8. I am aware I should not use any illegal or recreational drugs. I agree not to drink alcohol unless my care team says I can.     9. If I enroll in the [place] Medical Cannabis program, I will tell my care team before my next refill.    10. I will tell my care team right away if I become pregnant, have a new medical problem treated outside of my regular clinic, or have a change in my medicines.     11. I understand that this medicine can affect my thinking, judgment and reaction time.? Alcohol and drugs affect the brain and body, which can affect the safety of my driving. Being under the influence of alcohol or drugs can affect my decision-making, behaviors, personal safety and the safety of others. Driving while impaired (DWI) can occur if a person is driving, operating or in physical control of a car, motorcycle, boat, snowmobile, ATV, motorbike, off-road vehicle or any other motor vehicle ([place] Statute 169A.20). I understand the risk if I choose to drive or operate any vehicle or machinery.    I understand that if I do not follow any of the conditions above, my prescriptions or treatment may be stopped or changed.   I agree that my provider, clinic care team and pharmacy may work with any city, state or federal law enforcement agency that investigates the misuse, sale or other diversion of my controlled medicine. I will allow my provider to discuss my care with, or share a copy of, this agreement with any other treating provider, pharmacy or emergency room where I receive care.     I have read this agreement and have asked questions about anything I did not understand.    ________________________________________________________  Patient Signature - Sanaz Lange     ___________________                   Date     ________________________________________________________  Provider Signature - Marnie SEYMOUR  Parnell, APRN CNP       ___________________                   Date     ________________________________________________________  Witness Signature (required if provider not present while patient signing)          ___________________                   Date

## 2023-01-11 NOTE — PROGRESS NOTES
SUBJECTIVE:   CC: Sanaz is an 22 year old who presents for preventive health visit.   Patient has been advised of split billing requirements and indicates understanding: Yes  Healthy Habits:     Getting at least 3 servings of Calcium per day:  Yes    Bi-annual eye exam:  Yes    Dental care twice a year:  Yes    Sleep apnea or symptoms of sleep apnea:  None    Diet:  Regular (no restrictions)    Frequency of exercise:  1 day/week    Duration of exercise:  15-30 minutes    Taking medications regularly:  Yes    Medication side effects:  None    PHQ-2 Total Score: 3    Additional concerns today:  Yes          Medication Followup of ADHD/Adderall    Taking Medication as prescribed: yes    Side Effects:  None    Medication Helping Symptoms:  yes    Depression and Anxiety Follow-Up    How are you doing with your depression since your last visit? No change    How are you doing with your anxiety since your last visit?  No change    Are you having other symptoms that might be associated with depression or anxiety? No    Have you had a significant life event? No     Do you have any concerns with your use of alcohol or other drugs? No     Lamictal, hydroxyzine, Effexor.    Cold sore needs valtrex.      Birth control refill needed.       Social History     Tobacco Use     Smoking status: Never     Smokeless tobacco: Never   Substance Use Topics     Alcohol use: No     Alcohol/week: 0.0 standard drinks     Drug use: No     PHQ 10/11/2022 12/6/2022 1/11/2023   PHQ-9 Total Score 12 9 12   Q9: Thoughts of better off dead/self-harm past 2 weeks Not at all Not at all Not at all   F/U: Thoughts of suicide or self-harm - - -   F/U: Safety concerns - - -   PHQ-A Total Score - - -   PHQ-A Depressed most days in past year - - -   PHQ-A Mood affect on daily activities - - -   PHQ-A Suicide Ideation past 2 weeks - - -   PHQ-A Suicide Ideation past month - - -   PHQ-A Previous suicide attempt - - -     JUANJOSE-7 SCORE 1/18/2022 4/14/2022  12/6/2022   Total Score 13 (moderate anxiety) - 11 (moderate anxiety)   Total Score 13 9 11       JUANJOSE-7 SCORE 1/18/2022 4/14/2022 12/6/2022   Total Score 13 (moderate anxiety) - 11 (moderate anxiety)   Total Score 13 9 11           Today's PHQ-2 Score:   PHQ-2 ( 1999 [employer]) 1/11/2023   Q1: Little interest or pleasure in doing things 1   Q2: Feeling down, depressed or hopeless 2   PHQ-2 Score 3   PHQ-2 Total Score (12-17 Years)- Positive if 3 or more points; Administer PHQ-A if positive -   Q1: Little interest or pleasure in doing things Several days   Q2: Feeling down, depressed or hopeless More than half the days   PHQ-2 Score 3           Social History     Tobacco Use     Smoking status: Never     Smokeless tobacco: Never   Substance Use Topics     Alcohol use: No     Alcohol/week: 0.0 standard drinks     If you drink alcohol do you typically have >3 drinks per day or >7 drinks per week? No    Alcohol Use 1/11/2023   Prescreen: >3 drinks/day or >7 drinks/week? No   Prescreen: >3 drinks/day or >7 drinks/week? -       Reviewed orders with patient.  Reviewed health maintenance and updated orders accordingly - Yes  Lab work is in process  Labs reviewed in EPIC  BP Readings from Last 3 Encounters:   01/11/23 110/80   07/07/21 110/70   03/18/21 122/80    Wt Readings from Last 3 Encounters:   01/11/23 75.3 kg (166 lb)   07/07/21 78 kg (172 lb) (93 %, Z= 1.46)*   03/18/21 82.1 kg (181 lb) (95 %, Z= 1.66)*     * Growth percentiles are based on CDC (Girls, 2-20 Years) data.                  Patient Active Problem List   Diagnosis     Eczema     Other viral warts     Cervicalgia     Hypovitaminosis D     Attention deficit disorder, unspecified hyperactivity presence     Anxiety     Controlled substance agreement signed     Mild major depression (H)     Past Surgical History:   Procedure Laterality Date     NO HISTORY OF SURGERY         Social History     Tobacco Use     Smoking status: Never     Smokeless tobacco: Never    Substance Use Topics     Alcohol use: No     Alcohol/week: 0.0 standard drinks     Family History   Problem Relation Age of Onset     Other - See Comments Father         Sleep apnea     Diabetes Father      Anxiety Disorder Father      Depression Father      Attention Deficit Disorder Brother      Breast Cancer Other         Maternal Great Grandmother     Pancreatic Cancer Maternal Grandmother      Esophageal Cancer Maternal Grandfather      Other - See Comments Paternal Grandfather         amyloidosis     Colon Cancer No family hx of      Heart Disease No family hx of      Thyroid Disease No family hx of          Current Outpatient Medications   Medication Sig Dispense Refill     amphetamine-dextroamphetamine (ADDERALL XR) 20 MG 24 hr capsule Take 1 capsule (20 mg) by mouth daily for 30 days 30 capsule 0     [START ON 2/11/2023] amphetamine-dextroamphetamine (ADDERALL XR) 20 MG 24 hr capsule Take 1 capsule (20 mg) by mouth daily for 30 days 30 capsule 0     [START ON 3/14/2023] amphetamine-dextroamphetamine (ADDERALL XR) 20 MG 24 hr capsule Take 1 capsule (20 mg) by mouth daily for 30 days 30 capsule 0     cholecalciferol (VITAMIN D3) 125 MCG (5000 UT) TABS tablet Take 1 tablet (5,000 Units) by mouth daily       galcanezumab-gnlm (EMGALITY) 120 MG/ML injection Inject 120 mg Subcutaneous every 30 days       hydrOXYzine (ATARAX) 25 MG tablet Take 1 tablet (25 mg) by mouth 2 times daily as needed for anxiety (or panic) 30 tablet 5     lamoTRIgine (LAMICTAL) 25 MG tablet Take 2 tablets (50 mg) by mouth daily Take 25 mg daily for 1 week before increasing to 50 mg daily 180 tablet 1     norgestim-eth estrad triphasic (TRI-LO-SPRINTEC) 0.18/0.215/0.25 MG-25 MCG tablet Take 1 tablet by mouth daily 84 tablet 3     valACYclovir (VALTREX) 1000 mg tablet Take 2 tablets (2,000 mg) by mouth 2 times daily for 1 day 12 tablet 11     venlafaxine (EFFEXOR XR) 150 MG 24 hr capsule Take 1 capsule (150 mg) by mouth daily 90 capsule 1  "    Cetirizine HCl (ZYRTEC PO)        No Known Allergies    Breast Cancer Screening:        History of abnormal Pap smear: NO - under age 21, PAP not appropriate for age; offered and she declined.      Reviewed and updated as needed this visit by clinical staff   Tobacco  Allergies  Meds  Problems  Med Hx  Surg Hx  Fam Hx          Reviewed and updated as needed this visit by Provider   Tobacco  Allergies  Meds  Problems  Med Hx  Surg Hx  Fam Hx           Review of Systems   Constitutional: Negative for chills and fever.   HENT: Negative for congestion, ear pain, hearing loss and sore throat.    Eyes: Negative for pain and visual disturbance.   Respiratory: Negative for cough and shortness of breath.    Cardiovascular: Negative for chest pain, palpitations and peripheral edema.   Gastrointestinal: Negative for abdominal pain, constipation, diarrhea, heartburn, hematochezia and nausea.   Breasts:  Negative for tenderness, breast mass and discharge.   Genitourinary: Positive for vaginal bleeding and vaginal discharge. Negative for dysuria, frequency, genital sores, hematuria, pelvic pain and urgency.   Musculoskeletal: Negative for arthralgias, joint swelling and myalgias.   Skin: Negative for rash.   Neurological: Negative for dizziness, weakness, headaches and paresthesias.   Psychiatric/Behavioral: Negative for mood changes. The patient is not nervous/anxious.      Current on her menstrual cycle is what she means by bleeding. NO concerns.    OBJECTIVE:   /80   Pulse 106   Temp (!) 96.3  F (35.7  C) (Tympanic)   Resp 18   Ht 1.645 m (5' 4.75\")   Wt 75.3 kg (166 lb)   LMP 01/11/2023   SpO2 95%   BMI 27.84 kg/m    Physical Exam  GENERAL: healthy, alert and no distress  EYES: Eyes grossly normal to inspection, PERRL and conjunctivae and sclerae normal  HENT: ear canals and TM's normal, nose and mouth without ulcers or lesions  NECK: no adenopathy, no asymmetry, masses, or scars and thyroid " normal to palpation  RESP: lungs clear to auscultation - no rales, rhonchi or wheezes  BREAST: normal without masses, tenderness or nipple discharge and no palpable axillary masses or adenopathy  CV: regular rate and rhythm, normal S1 S2, no S3 or S4, no murmur, click or rub, no peripheral edema and peripheral pulses strong  ABDOMEN: soft, nontender, no hepatosplenomegaly, no masses and bowel sounds normal  MS: no gross musculoskeletal defects noted, no edema  SKIN: no suspicious lesions or rashes  NEURO: Normal strength and tone, mentation intact and speech normal  PSYCH: mentation appears normal, affect normal/bright    Diagnostic Test Results:  Labs reviewed in Epic    ASSESSMENT/PLAN:   Sanaz was seen today for physical.    Diagnoses and all orders for this visit:    Routine general medical examination at a health care facility  Well woman exam with breast exam completed today.    Labs today.    Will notify of lab results.  -     REVIEW OF HEALTH MAINTENANCE PROTOCOL ORDERS    Attention deficit disorder, unspecified hyperactivity presence  No concerns.  Stable.  Continue same medication this was refilled today.  PDMP, UDS, and CSA done today.  -     Drug Abuse Screen Panel 13, Urine (Pain Care Package) - lab collect; Future  -     amphetamine-dextroamphetamine (ADDERALL XR) 20 MG 24 hr capsule; Take 1 capsule (20 mg) by mouth daily for 30 days  -     amphetamine-dextroamphetamine (ADDERALL XR) 20 MG 24 hr capsule; Take 1 capsule (20 mg) by mouth daily for 30 days  -     amphetamine-dextroamphetamine (ADDERALL XR) 20 MG 24 hr capsule; Take 1 capsule (20 mg) by mouth daily for 30 days  -     Drug Abuse Screen Panel 13, Urine (Pain Care Package) - lab collect    Controlled substance agreement signed  -     amphetamine-dextroamphetamine (ADDERALL XR) 20 MG 24 hr capsule; Take 1 capsule (20 mg) by mouth daily for 30 days  -     amphetamine-dextroamphetamine (ADDERALL XR) 20 MG 24 hr capsule; Take 1 capsule (20 mg) by  mouth daily for 30 days  -     amphetamine-dextroamphetamine (ADDERALL XR) 20 MG 24 hr capsule; Take 1 capsule (20 mg) by mouth daily for 30 days    Anxiety  No concerns.  Stable.  Continue same medication this was refilled today.  Recheck mood in 6 months.  -     hydrOXYzine (ATARAX) 25 MG tablet; Take 1 tablet (25 mg) by mouth 2 times daily as needed for anxiety (or panic)  -     venlafaxine (EFFEXOR XR) 150 MG 24 hr capsule; Take 1 capsule (150 mg) by mouth daily    Mild recurrent major depression (H)  No concerns.  Stable.  Continue same medication this was refilled today.  Recheck mood in 6 months.  -     lamoTRIgine (LAMICTAL) 25 MG tablet; Take 2 tablets (50 mg) by mouth daily Take 25 mg daily for 1 week before increasing to 50 mg daily  -     hydrOXYzine (ATARAX) 25 MG tablet; Take 1 tablet (25 mg) by mouth 2 times daily as needed for anxiety (or panic)  -     venlafaxine (EFFEXOR XR) 150 MG 24 hr capsule; Take 1 capsule (150 mg) by mouth daily    Cold sore  No concerns.  Stable.  Continue same medication this was refilled today.  -     valACYclovir (VALTREX) 1000 mg tablet; Take 2 tablets (2,000 mg) by mouth 2 times daily for 1 day    Hypovitaminosis D  -     Vitamin D Deficiency; Future  -     Vitamin D Deficiency    Encounter for surveillance of contraceptive pills  -     norgestim-eth estrad triphasic (TRI-LO-SPRINTEC) 0.18/0.215/0.25 MG-25 MCG tablet; Take 1 tablet by mouth daily    Need for hepatitis C screening test  -     Hepatitis C Screen Reflex to HCV RNA Quant and Genotype; Future  -     Hepatitis C Screen Reflex to HCV RNA Quant and Genotype    Screening for HIV (human immunodeficiency virus)  -     HIV Antigen Antibody Combo; Future  -     HIV Antigen Antibody Combo    Encounter for long-term (current) use of medications  -     Comprehensive metabolic panel (BMP + Alb, Alk Phos, ALT, AST, Total. Bili, TP); Future  -     Comprehensive metabolic panel (BMP + Alb, Alk Phos, ALT, AST, Total. Bili,  TP)    Screening for STDs (sexually transmitted diseases)    Screening for thyroid disorder  -     TSH WITH FREE T4 REFLEX; Future  -     TSH WITH FREE T4 REFLEX    Screening for deficiency anemia  -     CBC with platelets; Future  -     CBC with platelets    Other orders  -     INFLUENZA VACCINE IM > 6 MONTHS VALENT IIV4 (AFLURIA/FLUZONE)  -     Cancel: Pap imaged thin layer screen only - recommended age 21 - 24 years        Patient has been advised of split billing requirements and indicates understanding: Yes      COUNSELING:  Reviewed preventive health counseling, as reflected in patient instructions        She reports that she has never smoked. She has never used smokeless tobacco.                  TYRONE [facility]  Answers for HPI/ROS submitted by the patient on 1/11/2023  If you checked off any problems, how difficult have these problems made it for you to do your work, take care of things at home, or get along with other people?: Somewhat difficult  PHQ9 TOTAL SCORE: 12

## 2023-01-12 LAB
DEPRECATED CALCIDIOL+CALCIFEROL SERPL-MC: 29 UG/L (ref 20–75)
HCV AB SERPL QL IA: NONREACTIVE
HIV 1+2 AB+HIV1 P24 AG SERPL QL IA: NONREACTIVE

## 2023-01-22 NOTE — RESULT ENCOUNTER NOTE
Dear Sanaz,    Here is a summary of your recent test results:    -Vitamin D level is low normal and getting 2000 IU daily in a supplement is recommended.   All other labs are normal.     For additional lab test information, labtestsonline.org is an excellent reference.    In addition, here is a list of due or overdue Health Maintenance reminders:    Please call us at [phone] (or use [employer]) to address the above recommendations if needed.    Thank you for choosing [facility].  It was an honor and a privilege to participate in your care.       Healthy regards,    Marnie Parnell, MOHINI  [facility]

## 2023-02-08 ENCOUNTER — VIRTUAL VISIT (OUTPATIENT)
Dept: PSYCHOLOGY | Facility: CLINIC | Age: 23
End: 2023-02-08
Payer: COMMERCIAL

## 2023-02-08 DIAGNOSIS — F41.1 GENERALIZED ANXIETY DISORDER: ICD-10-CM

## 2023-02-08 DIAGNOSIS — F33.1 MAJOR DEPRESSIVE DISORDER, RECURRENT EPISODE, MODERATE (H): Primary | ICD-10-CM

## 2023-02-08 DIAGNOSIS — F90.2 ATTENTION DEFICIT HYPERACTIVITY DISORDER, COMBINED TYPE: ICD-10-CM

## 2023-02-08 PROCEDURE — 90832 PSYTX W PT 30 MINUTES: CPT | Mod: VID | Performed by: COUNSELOR

## 2023-02-08 NOTE — PROGRESS NOTES
[facility]                                     Progress Note    Patient Name: Sanaz Lange  Date: 2/8/2023         Service Type: Individual      Session Start Time: 11:04am  Session End Time: 11:36am     Session Length: 32 mins     Session #: 5    Attendees: Client attended alone    Service Modality:  Video Visit:      Provider verified identity through the following two step process.  Patient provided:  Patient is known previously to provider    Telemedicine Visit: The patient's condition can be safely assessed and treated via synchronous audio and visual telemedicine encounter.      Reason for Telemedicine Visit: Services only offered telehealth    Originating Site (Patient Location): Patient's home    Distant Site (Provider Location): Provider Remote Setting- Home Office    Consent:  The patient/guardian has verbally consented to: the potential risks and benefits of telemedicine (video visit) versus in person care; bill my insurance or make self-payment for services provided; and responsibility for payment of non-covered services.     Patient would like the video invitation sent by:  My Chart    Mode of Communication:  Video Conference via Amwell    Distant Location (Provider):  Off-site    As the provider I attest to compliance with applicable laws and regulations related to telemedicine.    DATA  Interactive Complexity: No  Crisis: No        Progress Since Last Session (Related to Symptoms / Goals / Homework):   Symptoms: Improving      Homework: Achieved / completed to satisfaction      Episode of Care Goals: Achieved / completed to satisfaction - MAINTENANCE (Working to maintain change, with risk of relapse); Intervened by continuing to positively reinforce healthy behavior choice      Current / Ongoing Stressors and Concerns:   Mother/father communication      Treatment Objective(s) Addressed in This Session:   learn & utilize at least   assertive communication skills         Intervention:   Emotion Focused Therapy: supportive listening, validaton  Interpersonal Therapy: communication with mom     Assessments completed prior to visit:  The following assessments were completed by patient for this visit:  PHQ9:   PHQ-9 SCORE 1/18/2022 3/2/2022 4/14/2022 6/28/2022 10/11/2022 12/6/2022 1/11/2023   PHQ-9 Total Score MyChart 10 (Moderate depression) 10 (Moderate depression) - 8 (Mild depression) 12 (Moderate depression) 9 (Mild depression) 12 (Moderate depression)   PHQ-9 Total Score 10 10 9 8 12 9 12   PHQ-A Total Score - - - - - - -     GAD7:   JUANJOSE-7 SCORE 8/5/2021 9/21/2021 10/7/2021 10/27/2021 1/18/2022 4/14/2022 12/6/2022   Total Score - - 16 (severe anxiety) - 13 (moderate anxiety) - 11 (moderate anxiety)   Total Score 14 13 16 12 13 9 11         ASSESSMENT: Current Emotional / Mental Status (status of significant symptoms):   Risk status (Self / Other harm or suicidal ideation)   Patient denies current fears or concerns for personal safety.   Patient denies current or recent suicidal ideation or behaviors.   Patient denies current or recent homicidal ideation or behaviors.   Patient denies current or recent self injurious behavior or ideation.   Patient denies other safety concerns.   Patient reports there has been no change in risk factors since their last session.     Patient reports there has been no change in protective factors since their last session.     A safety and risk management plan has been developed including: Patient has no change in safety concerns. Committed to safety and agreed to follow previously developed safety plan..  Patient consented to co-developed safety plan.  Safety and risk management plan was completed.  Patient agreed to use safety plan should any safety concerns arise.  A copy was given to the patient.     Appearance:   Appropriate    Eye Contact:   Good    Psychomotor Behavior: Normal    Attitude:   Cooperative     Orientation:   All   Speech    Rate / Production: Normal     Volume:  Normal    Mood:    Normal   Affect:    Appropriate    Thought Content:  Clear    Thought Form:  Coherent  Logical    Insight:    Good  and Fair      Medication Review:   No changes to current psychiatric medication(s)     Medication Compliance:   Yes     Changes in Health Issues:   None reported     Chemical Use Review:   Substance Use: Chemical use reviewed, no active concerns identified      Tobacco Use: No current tobacco use.      Diagnosis:  1. Major depressive disorder, recurrent episode, moderate (H)    2. Attention deficit hyperactivity disorder, combined type    3. Generalized anxiety disorder        Collateral Reports Completed:   Not Applicable    PLAN: (Patient Tasks / Therapist Tasks / Other)  Provider assigned client to communicate with mother how her and dads relationship impacts clients stay with them         Marilyn Ga [facility] 2/8/2023                                                           ______________________________________________________________________     Individual Treatment Plan     Patient's Name: Sanaz Lange                 YOB: 2000     Date of Creation: 10/11/2022  Date Treatment Plan Last Reviewed/Revised: 10/11/2022     DSM5 Diagnoses:   1. Major depressive disorder, recurrent episode, moderate (H)    2. Attention deficit hyperactivity disorder, combined type    3. Generalized anxiety disorder       Psychosocial / Contextual Factors: college, peer relationships, organization, emotional regulation  PROMIS (reviewed every 90 days):      Referral / Collaboration:  Referral to another professional/service is not indicated at this time.     Anticipated number of session for this episode of care: 3-6 sessions  Anticipation frequency of session: Monthly  Anticipated Duration of each session: 38-52 minutes  Treatment plan will be reviewed in 90 days or when goals have been  changed.         Answers for HPI/ROS submitted by the patient on 10/11/2022  If you checked off any problems, how difficult have these problems made it for you to do your work, take care of things at home, or get along with other people?: Somewhat difficult  PHQ9 TOTAL SCORE: 12     MeasurableTreatment Goal(s) related to diagnosis / functional impairment(s)  Goal 1: Patient will report follow through with short term goals and therapy homework assignments     I will know I've met my goal when I have made progress on figuring out daytime sleepiness. Working on overall health (following a schedule in morning or evening) also including exercise.       Objective #A (Patient Action)                          Patient will set alarm for twenty mins earlier each morning   Status: New - Date: 11/15/22      Intervention(s)  Therapist will teach client problem solving skills when she encounters barriers .     Objective #B  Patient will wake once a week at desired time.  Status: New - Date: 11/15/22      Intervention(s)  Therapist will assist client in creating small SMART goals related to larger general goals .     Objective #C  Patient will be proactive in contacting doctor about sleep concerns   Status: New - Date: 11/15/22      Intervention(s)  Therapist will assist client in reaching out to provider if necessary .        Patient has reviewed and agreed to the above plan.        Marilyn Ga, [facility]                       November 15, 2022

## 2023-03-22 ENCOUNTER — VIRTUAL VISIT (OUTPATIENT)
Dept: PSYCHOLOGY | Facility: CLINIC | Age: 23
End: 2023-03-22
Payer: COMMERCIAL

## 2023-03-22 DIAGNOSIS — F41.1 GENERALIZED ANXIETY DISORDER: ICD-10-CM

## 2023-03-22 DIAGNOSIS — F33.1 MAJOR DEPRESSIVE DISORDER, RECURRENT EPISODE, MODERATE (H): Primary | ICD-10-CM

## 2023-03-22 DIAGNOSIS — F90.2 ATTENTION DEFICIT HYPERACTIVITY DISORDER, COMBINED TYPE: ICD-10-CM

## 2023-03-22 PROCEDURE — 90834 PSYTX W PT 45 MINUTES: CPT | Mod: VID | Performed by: COUNSELOR

## 2023-03-22 NOTE — PROGRESS NOTES
[facility]                                     Progress Note    Patient Name: Sanaz Lange  Date: 3/22/2023         Service Type: Individual      Session Start Time: 3:08pm  Session End Time: 3:53pm     Session Length: 45 mins     Session #: 6    Attendees: Client attended alone    Service Modality:  Video Visit:      Provider verified identity through the following two step process.  Patient provided:  Patient is known previously to provider    Telemedicine Visit: The patient's condition can be safely assessed and treated via synchronous audio and visual telemedicine encounter.      Reason for Telemedicine Visit: Services only offered telehealth    Originating Site (Patient Location): Patient's home    Distant Site (Provider Location): Provider Remote Setting- Home Office    Consent:  The patient/guardian has verbally consented to: the potential risks and benefits of telemedicine (video visit) versus in person care; bill my insurance or make self-payment for services provided; and responsibility for payment of non-covered services.     Patient would like the video invitation sent by:  My Chart    Mode of Communication:  Video Conference via Amwell    Distant Location (Provider):  Off-site    As the provider I attest to compliance with applicable laws and regulations related to telemedicine.    DATA  Interactive Complexity: No  Crisis: No        Progress Since Last Session (Related to Symptoms / Goals / Homework):   Symptoms: No change stable    Homework: Partially completed      Episode of Care Goals: Satisfactory progress - ACTION (Actively working towards change); Intervened by reinforcing change plan / affirming steps taken     Current / Ongoing Stressors and Concerns:   Family, finals upcoming      Treatment Objective(s) Addressed in This Session:   Increase interest, engagement, and pleasure in doing things  Decrease frequency and intensity of feeling down, depressed,  hopeless  Improve concentration, focus, and mindfulness in daily activities        Intervention:   Emotion Focused Therapy: supportive listening, validation, emotional processing         ASSESSMENT: Current Emotional / Mental Status (status of significant symptoms):   Risk status (Self / Other harm or suicidal ideation)   Patient denies current fears or concerns for personal safety.   Patient denies current or recent suicidal ideation or behaviors.   Patient denies current or recent homicidal ideation or behaviors.   Patient denies current or recent self injurious behavior or ideation.   Patient denies other safety concerns.   Patient reports there has been no change in risk factors since their last session.     Patient reports there has been no change in protective factors since their last session.     A safety and risk management plan has been developed including: Patient has no change in safety concerns. Committed to safety and agreed to follow previously developed safety plan..  Patient consented to co-developed safety plan.  Safety and risk management plan was completed.  Patient agreed to use safety plan should any safety concerns arise.  A copy was given to the patient.     Appearance:   Appropriate    Eye Contact:   Good    Psychomotor Behavior: Normal    Attitude:   Cooperative    Orientation:   All   Speech    Rate / Production: Normal     Volume:  Normal    Mood:    Normal   Affect:    Appropriate    Thought Content:  Rumination    Thought Form:  Coherent    Insight:    Good  and Fair      Medication Review:   No changes to current psychiatric medication(s)     Medication Compliance:   Yes     Changes in Health Issues:   None reported     Chemical Use Review:   Substance Use: Chemical use reviewed, no active concerns identified      Tobacco Use: No current tobacco use.      Diagnosis:  1. Major depressive disorder, recurrent episode, moderate (H)    2. Attention deficit hyperactivity disorder, combined  type    3. Generalized anxiety disorder        Collateral Reports Completed:   Not Applicable    PLAN: (Patient Tasks / Therapist Tasks / Other)  Provider assigned client to switch up her routine and get out of the house to help with mood         Marilyn Ga [facility] 3/22/2023                                                           ______________________________________________________________________     Individual Treatment Plan     Patient's Name: Sanaz Lange                 YOB: 2000     Date of Creation: 10/11/2022  Date Treatment Plan Last Reviewed/Revised: 10/11/2022     DSM5 Diagnoses:   1. Major depressive disorder, recurrent episode, moderate (H)    2. Attention deficit hyperactivity disorder, combined type    3. Generalized anxiety disorder       Psychosocial / Contextual Factors: college, peer relationships, organization, emotional regulation  PROMIS (reviewed every 90 days):      Referral / Collaboration:  Referral to another professional/service is not indicated at this time.     Anticipated number of session for this episode of care: 3-6 sessions  Anticipation frequency of session: Monthly  Anticipated Duration of each session: 38-52 minutes  Treatment plan will be reviewed in 90 days or when goals have been changed.         Answers for HPI/ROS submitted by the patient on 10/11/2022  If you checked off any problems, how difficult have these problems made it for you to do your work, take care of things at home, or get along with other people?: Somewhat difficult  PHQ9 TOTAL SCORE: 12     MeasurableTreatment Goal(s) related to diagnosis / functional impairment(s)  Goal 1: Patient will report follow through with short term goals and therapy homework assignments     I will know I've met my goal when I have made progress on figuring out daytime sleepiness. Working on overall health (following a schedule in morning or evening) also including exercise.       Objective #A (Patient  Action)                          Patient will set alarm for twenty mins earlier each morning   Status: New - Date: 11/15/22      Intervention(s)  Therapist will teach client problem solving skills when she encounters barriers .     Objective #B  Patient will wake once a week at desired time.  Status: New - Date: 11/15/22      Intervention(s)  Therapist will assist client in creating small SMART goals related to larger general goals .     Objective #C  Patient will be proactive in contacting doctor about sleep concerns   Status: New - Date: 11/15/22      Intervention(s)  Therapist will assist client in reaching out to provider if necessary .        Patient has reviewed and agreed to the above plan.        Marilyn Ga, [facility]                       November 15, 2022

## 2023-04-27 ENCOUNTER — VIRTUAL VISIT (OUTPATIENT)
Dept: PSYCHOLOGY | Facility: CLINIC | Age: 23
End: 2023-04-27
Payer: COMMERCIAL

## 2023-04-27 DIAGNOSIS — F90.2 ATTENTION DEFICIT HYPERACTIVITY DISORDER, COMBINED TYPE: ICD-10-CM

## 2023-04-27 DIAGNOSIS — F41.1 GENERALIZED ANXIETY DISORDER: ICD-10-CM

## 2023-04-27 DIAGNOSIS — F33.1 MAJOR DEPRESSIVE DISORDER, RECURRENT EPISODE, MODERATE (H): Primary | ICD-10-CM

## 2023-04-27 PROCEDURE — 90834 PSYTX W PT 45 MINUTES: CPT | Mod: VID | Performed by: COUNSELOR

## 2023-04-27 NOTE — PROGRESS NOTES
[facility]                                    Progress Note    Patient Name: Sanaz Lange  Date: 4/27/2023         Service Type: Individual      Session Start Time: 3:07pm  Session End Time: 3:45pm     Session Length: 38 mins     Session #: 7    Attendees: Client attended alone    Service Modality:  Video Visit:      Provider verified identity through the following two step process.  Patient provided:  Patient is known previously to provider    Telemedicine Visit: The patient's condition can be safely assessed and treated via synchronous audio and visual telemedicine encounter.      Reason for Telemedicine Visit: Services only offered telehealth    Originating Site (Patient Location): Patient's home    Distant Site (Provider Location): Provider Remote Setting- Home Office    Consent:  The patient/guardian has verbally consented to: the potential risks and benefits of telemedicine (video visit) versus in person care; bill my insurance or make self-payment for services provided; and responsibility for payment of non-covered services.     Patient would like the video invitation sent by:  My Chart    Mode of Communication:  Video Conference via Amwell    Distant Location (Provider):  Off-site    As the provider I attest to compliance with applicable laws and regulations related to telemedicine.    DATA  Interactive Complexity: No  Crisis: No        Progress Since Last Session (Related to Symptoms / Goals / Homework):   Symptoms: No change      Homework: Achieved / completed to satisfaction      Episode of Care Goals: Satisfactory progress - ACTION (Actively working towards change); Intervened by reinforcing change plan / affirming steps taken     Current / Ongoing Stressors and Concerns:   Reporting depersonalization, finals, moving home for summer      Treatment Objective(s) Addressed in This Session:   Depersonalization symptoms       Intervention:   Emotion Focused Therapy: emotional  processing, supportive listening, [address] grounding    Assessments completed prior to visit:  The following assessments were completed by patient for this visit:  PHQ9:       1/18/2022     2:53 PM 3/2/2022     1:01 PM 4/14/2022     9:02 AM 6/28/2022     4:10 PM 10/11/2022     2:56 PM 12/6/2022     8:59 AM 1/11/2023     9:04 AM   PHQ-9 SCORE   PHQ-9 Total Score MyChart 10 (Moderate depression) 10 (Moderate depression)  8 (Mild depression) 12 (Moderate depression) 9 (Mild depression) 12 (Moderate depression)   PHQ-9 Total Score 10 10 9 8 12 9 12     GAD7:       8/5/2021    10:30 AM 9/21/2021    11:40 AM 10/7/2021     6:48 AM 10/27/2021     1:05 PM 1/18/2022     2:54 PM 4/14/2022     9:02 AM 12/6/2022     9:00 AM   JUANJOSE-7 SCORE   Total Score   16 (severe anxiety)  13 (moderate anxiety)  11 (moderate anxiety)   Total Score 14 13 16 12 13 9 11         ASSESSMENT: Current Emotional / Mental Status (status of significant symptoms):   Risk status (Self / Other harm or suicidal ideation)   Patient denies current fears or concerns for personal safety.   Patient denies current or recent suicidal ideation or behaviors.   Patient denies current or recent homicidal ideation or behaviors.   Patient denies current or recent self injurious behavior or ideation.   Patient denies other safety concerns.   Patient reports there has been no change in risk factors since their last session.     Patient reports there has been no change in protective factors since their last session.     A safety and risk management plan has been developed including: Patient has no change in safety concerns. Committed to safety and agreed to follow previously developed safety plan..  Patient consented to co-developed safety plan.  Safety and risk management plan was completed.  Patient agreed to use safety plan should any safety concerns arise.  A copy was given to the patient.     Appearance:   Appropriate    Eye Contact:   Good    Psychomotor Behavior: Normal     Attitude:   Cooperative    Orientation:   All   Speech    Rate / Production: Normal     Volume:  Normal    Mood:    Normal   Affect:    Appropriate    Thought Content:  Clear    Thought Form:  Coherent  Logical    Insight:    Fair      Medication Review:   No changes to current psychiatric medication(s)     Medication Compliance:   Yes     Changes in Health Issues:   None reported     Chemical Use Review:   Substance Use: Chemical use reviewed, no active concerns identified      Tobacco Use: No current tobacco use.      Diagnosis:  1. Major depressive disorder, recurrent episode, moderate (H)    2. Attention deficit hyperactivity disorder, combined type    3. Generalized anxiety disorder        Collateral Reports Completed:   Not Applicable    PLAN: (Patient Tasks / Therapist Tasks / Other)  Provider assigned client to contact psychiatrist about depersonalization symptoms so they are aware         Marilyn Ga [facility] 4/27/2023                                                         ______________________________________________________________________     Individual Treatment Plan     Patient's Name: Sanaz Lange                 YOB: 2000     Date of Creation: 10/11/2022  Date Treatment Plan Last Reviewed/Revised: 10/11/2022     DSM5 Diagnoses:   1. Major depressive disorder, recurrent episode, moderate (H)    2. Attention deficit hyperactivity disorder, combined type    3. Generalized anxiety disorder       Psychosocial / Contextual Factors: college, peer relationships, organization, emotional regulation  PROMIS (reviewed every 90 days):      Referral / Collaboration:  Referral to another professional/service is not indicated at this time.     Anticipated number of session for this episode of care: 3-6 sessions  Anticipation frequency of session: Monthly  Anticipated Duration of each session: 38-52 minutes  Treatment plan will be reviewed in 90 days or when goals have been  changed.         Answers for HPI/ROS submitted by the patient on 10/11/2022  If you checked off any problems, how difficult have these problems made it for you to do your work, take care of things at home, or get along with other people?: Somewhat difficult  PHQ9 TOTAL SCORE: 12     MeasurableTreatment Goal(s) related to diagnosis / functional impairment(s)  Goal 1: Patient will report follow through with short term goals and therapy homework assignments     I will know I've met my goal when I have made progress on figuring out daytime sleepiness. Working on overall health (following a schedule in morning or evening) also including exercise.       Objective #A (Patient Action)                          Patient will set alarm for twenty mins earlier each morning   Status: New - Date: 11/15/22      Intervention(s)  Therapist will teach client problem solving skills when she encounters barriers .     Objective #B  Patient will wake once a week at desired time.  Status: New - Date: 11/15/22      Intervention(s)  Therapist will assist client in creating small SMART goals related to larger general goals .     Objective #C  Patient will be proactive in contacting doctor about sleep concerns   Status: New - Date: 11/15/22      Intervention(s)  Therapist will assist client in reaching out to provider if necessary .        Patient has reviewed and agreed to the above plan.        Marilyn Ga, [facility]                       November 15, 2022

## 2023-05-03 DIAGNOSIS — Z30.41 ENCOUNTER FOR SURVEILLANCE OF CONTRACEPTIVE PILLS: ICD-10-CM

## 2023-05-05 RX ORDER — NORGESTIMATE AND ETHINYL ESTRADIOL 7DAYSX3 LO
KIT ORAL
Qty: 84 TABLET | Refills: 3 | Status: SHIPPED | OUTPATIENT
Start: 2023-05-05 | End: 2024-02-12

## 2023-05-05 NOTE — TELEPHONE ENCOUNTER
Prescription approved per [facility] Refill Protocol.    Mouna Balbuena RN  [facility]

## 2023-05-30 ENCOUNTER — VIRTUAL VISIT (OUTPATIENT)
Dept: PSYCHOLOGY | Facility: CLINIC | Age: 23
End: 2023-05-30
Payer: COMMERCIAL

## 2023-05-30 DIAGNOSIS — F41.1 GENERALIZED ANXIETY DISORDER: ICD-10-CM

## 2023-05-30 DIAGNOSIS — F33.1 MAJOR DEPRESSIVE DISORDER, RECURRENT EPISODE, MODERATE (H): Primary | ICD-10-CM

## 2023-05-30 DIAGNOSIS — F90.2 ATTENTION DEFICIT HYPERACTIVITY DISORDER, COMBINED TYPE: ICD-10-CM

## 2023-05-30 PROCEDURE — 90834 PSYTX W PT 45 MINUTES: CPT | Mod: VID | Performed by: COUNSELOR

## 2023-05-30 NOTE — PROGRESS NOTES
[facility]                                     Progress Note    Patient Name: Sanaz Lange  Date: 5/30/2023         Service Type: Individual      Session Start Time: 1:03pm  Session End Time: 1:43pm     Session Length: 43 mins     Session #: 8    Attendees: Client attended alone    Service Modality:  Video Visit:      Provider verified identity through the following two step process.  Patient provided:  Patient is known previously to provider    Telemedicine Visit: The patient's condition can be safely assessed and treated via synchronous audio and visual telemedicine encounter.      Reason for Telemedicine Visit: Services only offered telehealth    Originating Site (Patient Location): Patient's place of employment    Distant Site (Provider Location): Provider Remote Setting- Home Office    Consent:  The patient/guardian has verbally consented to: the potential risks and benefits of telemedicine (video visit) versus in person care; bill my insurance or make self-payment for services provided; and responsibility for payment of non-covered services.     Patient would like the video invitation sent by:  My Chart    Mode of Communication:  Video Conference via Amwell    Distant Location (Provider):  Off-site    As the provider I attest to compliance with applicable laws and regulations related to telemedicine.    DATA  Interactive Complexity: No  Crisis: No        Progress Since Last Session (Related to Symptoms / Goals / Homework):   Symptoms: No change      Homework: Did not complete      Episode of Care Goals: Satisfactory progress - ACTION (Actively working towards change); Intervened by reinforcing change plan / affirming steps taken     Current / Ongoing Stressors and Concerns:   Work, home life      Treatment Objective(s) Addressed in This Session:   grounding work        Intervention:   Emotion Focused Therapy: supportive listening    Assessments completed prior to visit:  The  following assessments were completed by patient for this visit:  PHQ9:       1/18/2022     2:53 PM 3/2/2022     1:01 PM 4/14/2022     9:02 AM 6/28/2022     4:10 PM 10/11/2022     2:56 PM 12/6/2022     8:59 AM 1/11/2023     9:04 AM   PHQ-9 SCORE   PHQ-9 Total Score MyChart 10 (Moderate depression) 10 (Moderate depression)  8 (Mild depression) 12 (Moderate depression) 9 (Mild depression) 12 (Moderate depression)   PHQ-9 Total Score 10 10 9 8 12 9 12     GAD7:       8/5/2021    10:30 AM 9/21/2021    11:40 AM 10/7/2021     6:48 AM 10/27/2021     1:05 PM 1/18/2022     2:54 PM 4/14/2022     9:02 AM 12/6/2022     9:00 AM   JUANJOSE-7 SCORE   Total Score   16 (severe anxiety)  13 (moderate anxiety)  11 (moderate anxiety)   Total Score 14 13 16 12 13 9 11         ASSESSMENT: Current Emotional / Mental Status (status of significant symptoms):   Risk status (Self / Other harm or suicidal ideation)   Patient denies current fears or concerns for personal safety.   Patient denies current or recent suicidal ideation or behaviors.   Patient denies current or recent homicidal ideation or behaviors.   Patient denies current or recent self injurious behavior or ideation.   Patient denies other safety concerns.   Patient reports there has been no change in risk factors since their last session.     Patient reports there has been no change in protective factors since their last session.     A safety and risk management plan has been developed including: Patient has no change in safety concerns. Committed to safety and agreed to follow previously developed safety plan..  Patient consented to co-developed safety plan.  Safety and risk management plan was completed.  Patient agreed to use safety plan should any safety concerns arise.  A copy was given to the patient.     Appearance:   Appropriate    Eye Contact:   Good    Psychomotor Behavior: Normal    Attitude:   Cooperative    Orientation:   All   Speech    Rate / Production: Normal      Volume:  Normal    Mood:    Normal   Affect:    Appropriate    Thought Content:  Rumination    Thought Form:  Coherent    Insight:    Fair      Medication Review:   Changes to psychiatric medications, see updated Medication List in EPIC.      Medication Compliance:   No no adhd in summer     Changes in Health Issues:   None reported     Chemical Use Review:   Substance Use: Chemical use reviewed, no active concerns identified      Tobacco Use: No current tobacco use.      Diagnosis:  1. Major depressive disorder, recurrent episode, moderate (H)    2. Attention deficit hyperactivity disorder, combined type    3. Generalized anxiety disorder        Collateral Reports Completed:   Not Applicable    PLAN: (Patient Tasks / Therapist Tasks / Other)  Provider assigned client to use grounding when necessary         Marilyn Ga [facility] 5/30/2023                                                                                                              ______________________________________________________________________     Individual Treatment Plan     Patient's Name: Sanaz Lange                 YOB: 2000     Date of Creation: 10/11/2022  Date Treatment Plan Last Reviewed/Revised: 10/11/2022     DSM5 Diagnoses:   1. Major depressive disorder, recurrent episode, moderate (H)    2. Attention deficit hyperactivity disorder, combined type    3. Generalized anxiety disorder       Psychosocial / Contextual Factors: college, peer relationships, organization, emotional regulation  PROMIS (reviewed every 90 days):      Referral / Collaboration:  Referral to another professional/service is not indicated at this time.     Anticipated number of session for this episode of care: 3-6 sessions  Anticipation frequency of session: Monthly  Anticipated Duration of each session: 38-52 minutes  Treatment plan will be reviewed in 90 days or when goals have been changed.         Answers for HPI/ROS submitted by  the patient on 10/11/2022  If you checked off any problems, how difficult have these problems made it for you to do your work, take care of things at home, or get along with other people?: Somewhat difficult  PHQ9 TOTAL SCORE: 12     MeasurableTreatment Goal(s) related to diagnosis / functional impairment(s)  Goal 1: Patient will report follow through with short term goals and therapy homework assignments     I will know I've met my goal when I have made progress on figuring out daytime sleepiness. Working on overall health (following a schedule in morning or evening) also including exercise.       Objective #A (Patient Action)                          Patient will set alarm for twenty mins earlier each morning   Status: New - Date: 11/15/22      Intervention(s)  Therapist will teach client problem solving skills when she encounters barriers .     Objective #B  Patient will wake once a week at desired time.  Status: New - Date: 11/15/22      Intervention(s)  Therapist will assist client in creating small SMART goals related to larger general goals .     Objective #C  Patient will be proactive in contacting doctor about sleep concerns   Status: New - Date: 11/15/22      Intervention(s)  Therapist will assist client in reaching out to provider if necessary .        Patient has reviewed and agreed to the above plan.        Marilyn Ga, [facility]                       November 15, 2022

## 2023-07-30 DIAGNOSIS — F33.0 MILD RECURRENT MAJOR DEPRESSION (H): ICD-10-CM

## 2023-07-30 DIAGNOSIS — F41.9 ANXIETY: ICD-10-CM

## 2023-08-01 RX ORDER — VENLAFAXINE HYDROCHLORIDE 150 MG/1
150 CAPSULE, EXTENDED RELEASE ORAL DAILY
Qty: 90 CAPSULE | Refills: 0 | Status: SHIPPED | OUTPATIENT
Start: 2023-08-01 | End: 2023-10-26

## 2023-08-15 DIAGNOSIS — F33.0 MILD RECURRENT MAJOR DEPRESSION (H): ICD-10-CM

## 2023-08-16 RX ORDER — LAMOTRIGINE 25 MG/1
50 TABLET ORAL DAILY
Qty: 180 TABLET | Refills: 0 | Status: SHIPPED | OUTPATIENT
Start: 2023-08-16 | End: 2023-10-26

## 2023-08-21 ENCOUNTER — VIRTUAL VISIT (OUTPATIENT)
Dept: FAMILY MEDICINE | Facility: CLINIC | Age: 23
End: 2023-08-21
Payer: COMMERCIAL

## 2023-08-21 DIAGNOSIS — F32.0 MILD MAJOR DEPRESSION (H): ICD-10-CM

## 2023-08-21 DIAGNOSIS — F41.9 ANXIETY: Primary | ICD-10-CM

## 2023-08-21 PROCEDURE — 99214 OFFICE O/P EST MOD 30 MIN: CPT | Mod: VID | Performed by: NURSE PRACTITIONER

## 2023-08-21 ASSESSMENT — PATIENT HEALTH QUESTIONNAIRE - PHQ9
SUM OF ALL RESPONSES TO PHQ QUESTIONS 1-9: 7
SUM OF ALL RESPONSES TO PHQ QUESTIONS 1-9: 7
10. IF YOU CHECKED OFF ANY PROBLEMS, HOW DIFFICULT HAVE THESE PROBLEMS MADE IT FOR YOU TO DO YOUR WORK, TAKE CARE OF THINGS AT HOME, OR GET ALONG WITH OTHER PEOPLE: SOMEWHAT DIFFICULT

## 2023-08-21 ASSESSMENT — ANXIETY QUESTIONNAIRES
2. NOT BEING ABLE TO STOP OR CONTROL WORRYING: SEVERAL DAYS
5. BEING SO RESTLESS THAT IT IS HARD TO SIT STILL: MORE THAN HALF THE DAYS
3. WORRYING TOO MUCH ABOUT DIFFERENT THINGS: SEVERAL DAYS
1. FEELING NERVOUS, ANXIOUS, OR ON EDGE: SEVERAL DAYS
6. BECOMING EASILY ANNOYED OR IRRITABLE: MORE THAN HALF THE DAYS
IF YOU CHECKED OFF ANY PROBLEMS ON THIS QUESTIONNAIRE, HOW DIFFICULT HAVE THESE PROBLEMS MADE IT FOR YOU TO DO YOUR WORK, TAKE CARE OF THINGS AT HOME, OR GET ALONG WITH OTHER PEOPLE: SOMEWHAT DIFFICULT
GAD7 TOTAL SCORE: 8
4. TROUBLE RELAXING: SEVERAL DAYS
7. FEELING AFRAID AS IF SOMETHING AWFUL MIGHT HAPPEN: NOT AT ALL
GAD7 TOTAL SCORE: 8

## 2023-08-21 NOTE — PROGRESS NOTES
"Sanaz is a 23 year old who is being evaluated via a billable video visit.      How would you like to obtain your AVS? MyChart  If the video visit is dropped, the invitation should be resent by: Text to cell phone: [phone]  Will anyone else be joining your video visit? No      Assessment & Plan     Anxiety  Mild major depression (H)  I would like to talk with psychiatry about this as her feelings almost seem like she should have a change in medicine or maybe a decrease versus increase.  I will let her know once I talk with psychiatry about what the best plan of care is.  Sanaz verbalizes understanding of plan of care and is in agreement.        BMI:   Estimated body mass index is 27.84 kg/m  as calculated from the following:    Height as of 1/11/23: 1.645 m (5' 4.75\").    Weight as of 1/11/23: 75.3 kg (166 lb).       Return in about 4 weeks (around 9/18/2023) for virtual follow up, mood check.      TYRONE Gan [facility]   Sanaz is a 23 year old, presenting for the following health issues:  Recheck Medication        8/21/2023     1:40 PM   Additional Questions   Roomed by José TALBOT   Accompanied by Self       History of Present Illness       Mental Health Follow-up:  Patient presents to follow-up on Depression & Anxiety.Patient's depression since last visit has been:  Better  The patient is not having other symptoms associated with depression.  Patient's anxiety since last visit has been:  Good  The patient is not having other symptoms associated with anxiety.  Any significant life events: No  Patient is not feeling anxious or having panic attacks.  Patient has no concerns about alcohol or drug use.    She eats 2-3 servings of fruits and vegetables daily.She consumes 0 sweetened beverage(s) daily.She exercises with enough effort to increase her heart rate 10 to 19 minutes per day.  She exercises with enough effort to increase her heart rate 3 or less days per " week.   She is taking medications regularly.         12/6/2022     8:59 AM 1/11/2023     9:04 AM 8/21/2023     1:13 PM   PHQ   PHQ-9 Total Score 9 12 7   Q9: Thoughts of better off dead/self-harm past 2 weeks Not at all Not at all Not at all         4/14/2022     9:02 AM 12/6/2022     9:00 AM 8/21/2023     1:14 PM   JUANJOSE-7 SCORE   Total Score  11 (moderate anxiety) 8 (mild anxiety)   Total Score 9 11 8       Feels like she has numbness and depersonalization. Feels like she is having an out of body experience. Feels like watching a movie. At family events or more stressful events nothing feels real.   Reports this is not a new felling for her as she has felt this way for some time but has not brought up in the past.  Has no thoughts of self-harm or concerns  The more it is stressful the more she can just sit there.   Wants to know if she should increase Lamictal.  Previously seen by psychiatry and meds have been taken over here with us in family practice since she was stable.  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   School schedule and she is available tomorrow after 5 PM; after 11 am wed and Thurs after 2pm.       Review of Systems   Constitutional, HEENT, cardiovascular, pulmonary, GI, , musculoskeletal, neuro, skin, endocrine and psych systems are negative, except as otherwise noted in the HPI.       Objective           Vitals:  No vitals were obtained today due to virtual visit.    Physical Exam   GENERAL: Healthy, alert and no distress  EYES: Eyes grossly normal to inspection.  No discharge or erythema, or obvious scleral/conjunctival abnormalities.  RESP: No audible wheeze, cough, or visible cyanosis.  No visible retractions or increased work of breathing.    SKIN: Visible skin clear. No significant rash, abnormal pigmentation or lesions.  NEURO: Cranial nerves grossly intact.  Mentation and speech appropriate for age.  PSYCH: Mentation appears normal, affect flat,  judgement and insight intact, normal speech and appearance well-groomed.      Video-Visit Details    Type of service:  Video Visit     Originating Location (pt. Location): Home  Distant Location (provider location):  On-site  Platform used for Video Visit: Everton

## 2023-10-26 DIAGNOSIS — F33.0 MILD RECURRENT MAJOR DEPRESSION (H): ICD-10-CM

## 2023-10-26 DIAGNOSIS — F41.9 ANXIETY: ICD-10-CM

## 2023-10-26 RX ORDER — LAMOTRIGINE 25 MG/1
50 TABLET ORAL DAILY
Qty: 60 TABLET | Refills: 0 | Status: SHIPPED | OUTPATIENT
Start: 2023-10-26 | End: 2023-11-26

## 2023-10-26 RX ORDER — VENLAFAXINE HYDROCHLORIDE 150 MG/1
150 CAPSULE, EXTENDED RELEASE ORAL DAILY
Qty: 30 CAPSULE | Refills: 0 | Status: SHIPPED | OUTPATIENT
Start: 2023-10-26 | End: 2023-11-24

## 2023-10-26 NOTE — TELEPHONE ENCOUNTER
Refill for Venlafaxine already pending. Pended Lamotrigine.    Mirlande Pena RN [facility]

## 2023-10-26 NOTE — TELEPHONE ENCOUNTER
Reason for Call:  Other prescription    Detailed comments: patient called and has been out of medications for:    1)  Lamotragene  2)  Cenlafaxine    Would like to pickup today at [facility]     Please contact patient.  Thank you.    Phone Number Patient can be reached at: Cell number on file:    Telephone Information:   Mobile [phone]       Best Time: any    Can we leave a detailed message on this number? YES    Call taken on 10/26/2023 at 1:56 PM by Chasity Deutsch

## 2023-11-23 DIAGNOSIS — F41.9 ANXIETY: ICD-10-CM

## 2023-11-23 DIAGNOSIS — F33.0 MILD RECURRENT MAJOR DEPRESSION (H): ICD-10-CM

## 2023-11-24 RX ORDER — VENLAFAXINE HYDROCHLORIDE 150 MG/1
150 CAPSULE, EXTENDED RELEASE ORAL DAILY
Qty: 30 CAPSULE | Refills: 0 | Status: SHIPPED | OUTPATIENT
Start: 2023-11-24 | End: 2023-12-27

## 2023-12-25 ENCOUNTER — TELEPHONE (OUTPATIENT)
Dept: FAMILY MEDICINE | Facility: CLINIC | Age: 23
End: 2023-12-25
Payer: COMMERCIAL

## 2023-12-25 DIAGNOSIS — F41.9 ANXIETY: ICD-10-CM

## 2023-12-25 DIAGNOSIS — F33.0 MILD RECURRENT MAJOR DEPRESSION (H): ICD-10-CM

## 2023-12-26 NOTE — TELEPHONE ENCOUNTER
Attempt # 1    Called # [phone]     Left a non detailed VM to call back at [phone] and ask for any available Triage Nurse.    KAROLINA CURRY RN on 12/26/2023 at 12:23 PM   [facility]

## 2023-12-26 NOTE — TELEPHONE ENCOUNTER
Please call Sanaz.    It is recommended by Usha Hobbs that we increase her Lamictal to 100 mg given her side effect of emotional blunting.     Is she open to this change? If so I will send 100 mg Lamictal to the pharmacy and she needs set up for a virtual recheck of mood with me in 4 weeks.     Please set up her follow up and route back to me.           Marnie Parnell, DIVINAP-BC

## 2023-12-27 RX ORDER — VENLAFAXINE HYDROCHLORIDE 150 MG/1
150 CAPSULE, EXTENDED RELEASE ORAL DAILY
Qty: 30 CAPSULE | Refills: 0 | Status: SHIPPED | OUTPATIENT
Start: 2023-12-27 | End: 2024-01-22

## 2023-12-27 RX ORDER — LAMOTRIGINE 25 MG/1
50 TABLET ORAL DAILY
Qty: 60 TABLET | Refills: 0 | Status: SHIPPED | OUTPATIENT
Start: 2023-12-27 | End: 2024-01-22 | Stop reason: DRUGHIGH

## 2023-12-28 NOTE — TELEPHONE ENCOUNTER
Ok for#30 days. Needs follow up appt with TYRONE Cox CNP  . Please assist pt in making appt(s) for the above.   --Eryn Miles MD  for TYRONE Cox CNP  out of office.

## 2024-01-22 ENCOUNTER — VIRTUAL VISIT (OUTPATIENT)
Dept: FAMILY MEDICINE | Facility: CLINIC | Age: 24
End: 2024-01-22
Payer: COMMERCIAL

## 2024-01-22 DIAGNOSIS — F41.9 ANXIETY: ICD-10-CM

## 2024-01-22 DIAGNOSIS — B00.1 COLD SORE: ICD-10-CM

## 2024-01-22 DIAGNOSIS — F33.0 MILD RECURRENT MAJOR DEPRESSION (H): ICD-10-CM

## 2024-01-22 PROCEDURE — 96127 BRIEF EMOTIONAL/BEHAV ASSMT: CPT | Mod: 95 | Performed by: NURSE PRACTITIONER

## 2024-01-22 PROCEDURE — 99214 OFFICE O/P EST MOD 30 MIN: CPT | Mod: 95 | Performed by: NURSE PRACTITIONER

## 2024-01-22 RX ORDER — VENLAFAXINE HYDROCHLORIDE 150 MG/1
150 CAPSULE, EXTENDED RELEASE ORAL DAILY
Qty: 90 CAPSULE | Refills: 1 | Status: SHIPPED | OUTPATIENT
Start: 2024-01-22 | End: 2024-05-03

## 2024-01-22 RX ORDER — LAMOTRIGINE 100 MG/1
100 TABLET ORAL DAILY
Qty: 90 TABLET | Refills: 1 | Status: SHIPPED | OUTPATIENT
Start: 2024-01-22 | End: 2024-05-03

## 2024-01-22 RX ORDER — LAMOTRIGINE 25 MG/1
50 TABLET ORAL DAILY
Qty: 60 TABLET | Refills: 0 | Status: CANCELLED | OUTPATIENT
Start: 2024-01-22

## 2024-01-22 RX ORDER — ATOGEPANT 60 MG/1
TABLET ORAL
COMMUNITY
Start: 2023-07-14

## 2024-01-22 RX ORDER — VALACYCLOVIR HYDROCHLORIDE 1 G/1
2000 TABLET, FILM COATED ORAL 2 TIMES DAILY
Qty: 12 TABLET | Refills: 3 | Status: SHIPPED | OUTPATIENT
Start: 2024-01-22

## 2024-01-22 ASSESSMENT — ANXIETY QUESTIONNAIRES
3. WORRYING TOO MUCH ABOUT DIFFERENT THINGS: SEVERAL DAYS
IF YOU CHECKED OFF ANY PROBLEMS ON THIS QUESTIONNAIRE, HOW DIFFICULT HAVE THESE PROBLEMS MADE IT FOR YOU TO DO YOUR WORK, TAKE CARE OF THINGS AT HOME, OR GET ALONG WITH OTHER PEOPLE: SOMEWHAT DIFFICULT
GAD7 TOTAL SCORE: 7
6. BECOMING EASILY ANNOYED OR IRRITABLE: SEVERAL DAYS
GAD7 TOTAL SCORE: 7
5. BEING SO RESTLESS THAT IT IS HARD TO SIT STILL: MORE THAN HALF THE DAYS
7. FEELING AFRAID AS IF SOMETHING AWFUL MIGHT HAPPEN: NOT AT ALL
1. FEELING NERVOUS, ANXIOUS, OR ON EDGE: SEVERAL DAYS
2. NOT BEING ABLE TO STOP OR CONTROL WORRYING: SEVERAL DAYS
8. IF YOU CHECKED OFF ANY PROBLEMS, HOW DIFFICULT HAVE THESE MADE IT FOR YOU TO DO YOUR WORK, TAKE CARE OF THINGS AT HOME, OR GET ALONG WITH OTHER PEOPLE?: SOMEWHAT DIFFICULT
GAD7 TOTAL SCORE: 7
4. TROUBLE RELAXING: SEVERAL DAYS
7. FEELING AFRAID AS IF SOMETHING AWFUL MIGHT HAPPEN: NOT AT ALL

## 2024-01-22 NOTE — PROGRESS NOTES
Sanaz is a 23 year old who is being evaluated via a billable video visit.      How would you like to obtain your AVS? MyChart  If the video visit is dropped, the invitation should be resent by: Text to cell phone: [phone]  Will anyone else be joining your video visit? No    Assessment & Plan     Mild recurrent major depression (H24)  Increase Lamictal. MyChart me with response,   Follow up with physical mood check this summer.   - venlafaxine (EFFEXOR XR) 150 MG 24 hr capsule  Dispense: 90 capsule; Refill: 1  - lamoTRIgine (LAMICTAL) 100 MG tablet  Dispense: 90 tablet; Refill: 1    Anxiety    - venlafaxine (EFFEXOR XR) 150 MG 24 hr capsule  Dispense: 90 capsule; Refill: 1  - lamoTRIgine (LAMICTAL) 100 MG tablet  Dispense: 90 tablet; Refill: 1    Cold sore    - valACYclovir (VALTREX) 1000 mg tablet  Dispense: 12 tablet; Refill: 3      Return in about 4 weeks (around 2/19/2024) for Wellness exam fasting labs.    Subjective   Sanaz is a 23 year old, presenting for the following health issues:  Recheck Medication      1/22/2024     3:56 PM   Additional Questions   Roomed by José TALBOT   Accompanied by Self     Pt is at college, will be home in May and will schedule for a Physical then.     History of Present Illness       Mental Health Follow-up:  Patient presents to follow-up on Depression & Anxiety.Patient's depression since last visit has been:  Good  The patient is not having other symptoms associated with depression.  Patient's anxiety since last visit has been:  Good  The patient is not having other symptoms associated with anxiety.  Any significant life events: No  Patient is not feeling anxious or having panic attacks.  Patient has no concerns about alcohol or drug use.    She eats 2-3 servings of fruits and vegetables daily.She consumes 0 sweetened beverage(s) daily.She exercises with enough effort to increase her heart rate 9 or less minutes per day.  She exercises with enough effort to increase her heart  rate 3 or less days per week.   She is taking medications regularly.         1/11/2023     9:04 AM 8/21/2023     1:13 PM 1/22/2024     3:52 PM   PHQ   PHQ-9 Total Score 12 7 8   Q9: Thoughts of better off dead/self-harm past 2 weeks Not at all Not at all Not at all         12/6/2022     9:00 AM 8/21/2023     1:14 PM 1/22/2024     3:53 PM   JUANJOSE-7 SCORE   Total Score 11 (moderate anxiety) 8 (mild anxiety) 7 (mild anxiety)   Total Score 11 8 7            Please call Sanaz.     It is recommended by Usha Perry that we increase her Lamictal to 100 mg given her side effect of emotional blunting.      Is she open to this change? If so I will send 100 mg Lamictal to the pharmacy and she needs set up for a virtual recheck of mood with me in 4 weeks.      Please set up her follow up and route back to me.        Emotional blunting recommend to increase Lamictal she was unaware.   Has not increased Lamictal yet.   Cold sore med refill.      Constitutional, HEENT, cardiovascular, pulmonary, GI, , musculoskeletal, neuro, skin, endocrine and psych systems are negative, except as otherwise noted in the HPI.       Objective           Vitals:  No vitals were obtained today due to virtual visit.    Physical Exam   GENERAL: alert and no distress  EYES: Eyes grossly normal to inspection.  No discharge or erythema, or obvious scleral/conjunctival abnormalities.  RESP: No audible wheeze, cough, or visible cyanosis.    SKIN: Visible skin clear. No significant rash, abnormal pigmentation or lesions.  NEURO: Cranial nerves grossly intact.  Mentation and speech appropriate for age.  PSYCH: Appropriate affect, tone, and pace of words      Video-Visit Details    Type of service:  Video Visit     Originating Location (pt. Location): Home  Distant Location (provider location):  On-site  Platform used for Video Visit: Everton  Signed Electronically by: TYRONE Gan CNP

## 2024-02-10 DIAGNOSIS — Z30.41 ENCOUNTER FOR SURVEILLANCE OF CONTRACEPTIVE PILLS: ICD-10-CM

## 2024-02-12 RX ORDER — NORGESTIMATE AND ETHINYL ESTRADIOL 7DAYSX3 LO
KIT ORAL
Qty: 84 TABLET | Refills: 2 | Status: SHIPPED | OUTPATIENT
Start: 2024-02-12 | End: 2024-05-03

## 2024-03-13 ENCOUNTER — E-VISIT (OUTPATIENT)
Dept: FAMILY MEDICINE | Facility: CLINIC | Age: 24
End: 2024-03-13
Payer: COMMERCIAL

## 2024-03-13 DIAGNOSIS — F33.0 MILD RECURRENT MAJOR DEPRESSION (H): Primary | ICD-10-CM

## 2024-03-13 DIAGNOSIS — F41.9 ANXIETY: ICD-10-CM

## 2024-03-13 PROCEDURE — 99207 PR NON-BILLABLE SERV PER CHARTING: CPT | Performed by: NURSE PRACTITIONER

## 2024-03-13 NOTE — PATIENT INSTRUCTIONS
Sanaz,     Glad to hear you are doing better with that dose.   Please follow-up with in person physical exam this summer your home from college.    You can schedule an appointment right here in [facility], or call [phone].    Healthy regards,            Marnie Parnell, FNP-BC

## 2024-03-13 NOTE — TELEPHONE ENCOUNTER
Provider E-Visit time total (minutes): 1 minute.       Healthy regards,            Marnie Parnell, FNP-BC

## 2024-04-13 ENCOUNTER — HEALTH MAINTENANCE LETTER (OUTPATIENT)
Age: 24
End: 2024-04-13

## 2024-05-02 SDOH — HEALTH STABILITY: PHYSICAL HEALTH: ON AVERAGE, HOW MANY MINUTES DO YOU ENGAGE IN EXERCISE AT THIS LEVEL?: 30 MIN

## 2024-05-02 SDOH — HEALTH STABILITY: PHYSICAL HEALTH: ON AVERAGE, HOW MANY DAYS PER WEEK DO YOU ENGAGE IN MODERATE TO STRENUOUS EXERCISE (LIKE A BRISK WALK)?: 2 DAYS

## 2024-05-02 ASSESSMENT — ANXIETY QUESTIONNAIRES
GAD7 TOTAL SCORE: 8
5. BEING SO RESTLESS THAT IT IS HARD TO SIT STILL: MORE THAN HALF THE DAYS
7. FEELING AFRAID AS IF SOMETHING AWFUL MIGHT HAPPEN: NOT AT ALL
7. FEELING AFRAID AS IF SOMETHING AWFUL MIGHT HAPPEN: NOT AT ALL
GAD7 TOTAL SCORE: 8
3. WORRYING TOO MUCH ABOUT DIFFERENT THINGS: SEVERAL DAYS
2. NOT BEING ABLE TO STOP OR CONTROL WORRYING: SEVERAL DAYS
IF YOU CHECKED OFF ANY PROBLEMS ON THIS QUESTIONNAIRE, HOW DIFFICULT HAVE THESE PROBLEMS MADE IT FOR YOU TO DO YOUR WORK, TAKE CARE OF THINGS AT HOME, OR GET ALONG WITH OTHER PEOPLE: SOMEWHAT DIFFICULT
8. IF YOU CHECKED OFF ANY PROBLEMS, HOW DIFFICULT HAVE THESE MADE IT FOR YOU TO DO YOUR WORK, TAKE CARE OF THINGS AT HOME, OR GET ALONG WITH OTHER PEOPLE?: SOMEWHAT DIFFICULT
4. TROUBLE RELAXING: MORE THAN HALF THE DAYS
1. FEELING NERVOUS, ANXIOUS, OR ON EDGE: SEVERAL DAYS
GAD7 TOTAL SCORE: 8
6. BECOMING EASILY ANNOYED OR IRRITABLE: SEVERAL DAYS

## 2024-05-02 ASSESSMENT — PATIENT HEALTH QUESTIONNAIRE - PHQ9
SUM OF ALL RESPONSES TO PHQ QUESTIONS 1-9: 9
10. IF YOU CHECKED OFF ANY PROBLEMS, HOW DIFFICULT HAVE THESE PROBLEMS MADE IT FOR YOU TO DO YOUR WORK, TAKE CARE OF THINGS AT HOME, OR GET ALONG WITH OTHER PEOPLE: SOMEWHAT DIFFICULT
SUM OF ALL RESPONSES TO PHQ QUESTIONS 1-9: 9

## 2024-05-02 ASSESSMENT — SOCIAL DETERMINANTS OF HEALTH (SDOH): HOW OFTEN DO YOU GET TOGETHER WITH FRIENDS OR RELATIVES?: MORE THAN THREE TIMES A WEEK

## 2024-05-03 ENCOUNTER — OFFICE VISIT (OUTPATIENT)
Dept: FAMILY MEDICINE | Facility: CLINIC | Age: 24
End: 2024-05-03
Payer: COMMERCIAL

## 2024-05-03 VITALS
BODY MASS INDEX: 30.32 KG/M2 | WEIGHT: 182 LBS | HEART RATE: 93 BPM | SYSTOLIC BLOOD PRESSURE: 121 MMHG | RESPIRATION RATE: 12 BRPM | HEIGHT: 65 IN | TEMPERATURE: 97.9 F | OXYGEN SATURATION: 99 % | DIASTOLIC BLOOD PRESSURE: 82 MMHG

## 2024-05-03 DIAGNOSIS — E55.9 HYPOVITAMINOSIS D: ICD-10-CM

## 2024-05-03 DIAGNOSIS — Z12.4 CERVICAL CANCER SCREENING: ICD-10-CM

## 2024-05-03 DIAGNOSIS — B00.1 COLD SORE: ICD-10-CM

## 2024-05-03 DIAGNOSIS — Z79.899 MEDICATION MANAGEMENT: ICD-10-CM

## 2024-05-03 DIAGNOSIS — F41.9 ANXIETY: ICD-10-CM

## 2024-05-03 DIAGNOSIS — F33.0 MILD RECURRENT MAJOR DEPRESSION (H): ICD-10-CM

## 2024-05-03 DIAGNOSIS — Z30.41 ENCOUNTER FOR SURVEILLANCE OF CONTRACEPTIVE PILLS: ICD-10-CM

## 2024-05-03 DIAGNOSIS — Z11.3 SCREEN FOR STD (SEXUALLY TRANSMITTED DISEASE): ICD-10-CM

## 2024-05-03 DIAGNOSIS — F98.8 ATTENTION DEFICIT DISORDER, UNSPECIFIED HYPERACTIVITY PRESENCE: ICD-10-CM

## 2024-05-03 DIAGNOSIS — Z23 NEED FOR DIPHTHERIA-TETANUS-PERTUSSIS (TDAP) VACCINE: ICD-10-CM

## 2024-05-03 DIAGNOSIS — Z00.00 ROUTINE GENERAL MEDICAL EXAMINATION AT A HEALTH CARE FACILITY: Primary | ICD-10-CM

## 2024-05-03 LAB
AMPHETAMINES UR QL: NOT DETECTED
BARBITURATES UR QL SCN: NOT DETECTED
BENZODIAZ UR QL SCN: NOT DETECTED
BUPRENORPHINE UR QL: NOT DETECTED
CANNABINOIDS UR QL: NOT DETECTED
COCAINE UR QL SCN: NOT DETECTED
D-METHAMPHET UR QL: NOT DETECTED
METHADONE UR QL SCN: NOT DETECTED
OPIATES UR QL SCN: NOT DETECTED
OXYCODONE UR QL SCN: NOT DETECTED
PCP UR QL SCN: NOT DETECTED
TRICYCLICS UR QL SCN: NOT DETECTED

## 2024-05-03 PROCEDURE — G0145 SCR C/V CYTO,THINLAYER,RESCR: HCPCS | Performed by: NURSE PRACTITIONER

## 2024-05-03 PROCEDURE — 87591 N.GONORRHOEAE DNA AMP PROB: CPT | Performed by: NURSE PRACTITIONER

## 2024-05-03 PROCEDURE — 87491 CHLMYD TRACH DNA AMP PROBE: CPT | Performed by: NURSE PRACTITIONER

## 2024-05-03 PROCEDURE — 99395 PREV VISIT EST AGE 18-39: CPT | Mod: 25 | Performed by: NURSE PRACTITIONER

## 2024-05-03 PROCEDURE — 90715 TDAP VACCINE 7 YRS/> IM: CPT | Performed by: NURSE PRACTITIONER

## 2024-05-03 PROCEDURE — 96127 BRIEF EMOTIONAL/BEHAV ASSMT: CPT | Performed by: NURSE PRACTITIONER

## 2024-05-03 PROCEDURE — 99214 OFFICE O/P EST MOD 30 MIN: CPT | Mod: 25 | Performed by: NURSE PRACTITIONER

## 2024-05-03 PROCEDURE — 80306 DRUG TEST PRSMV INSTRMNT: CPT | Performed by: NURSE PRACTITIONER

## 2024-05-03 PROCEDURE — 90471 IMMUNIZATION ADMIN: CPT | Performed by: NURSE PRACTITIONER

## 2024-05-03 RX ORDER — HYDROXYZINE HYDROCHLORIDE 25 MG/1
25 TABLET, FILM COATED ORAL 2 TIMES DAILY PRN
Qty: 90 TABLET | Refills: 2 | Status: SHIPPED | OUTPATIENT
Start: 2024-05-03

## 2024-05-03 RX ORDER — LAMOTRIGINE 100 MG/1
100 TABLET ORAL DAILY
Qty: 90 TABLET | Refills: 1 | Status: SHIPPED | OUTPATIENT
Start: 2024-05-03

## 2024-05-03 RX ORDER — NORGESTIMATE AND ETHINYL ESTRADIOL 7DAYSX3 LO
1 KIT ORAL DAILY
Qty: 84 TABLET | Refills: 4 | Status: SHIPPED | OUTPATIENT
Start: 2024-05-03

## 2024-05-03 RX ORDER — VENLAFAXINE HYDROCHLORIDE 150 MG/1
150 CAPSULE, EXTENDED RELEASE ORAL DAILY
Qty: 90 CAPSULE | Refills: 1 | Status: SHIPPED | OUTPATIENT
Start: 2024-05-03

## 2024-05-03 NOTE — PATIENT INSTRUCTIONS
Preventive Care Advice   This is general advice given by our system to help you stay healthy. However, your care team may have specific advice just for you. Please talk to your care team about your preventive care needs.  Nutrition  Eat 5 or more servings of fruits and vegetables each day.  Try wheat bread, brown rice and whole grain pasta (instead of white bread, rice, and pasta).  Get enough calcium and vitamin D. Check the label on foods and aim for 100% of the RDA (recommended daily allowance).  Lifestyle  Exercise at least 150 minutes each week   (30 minutes a day, 5 days a week).  Do muscle strengthening activities 2 days a week. These help control your weight and prevent disease.  No smoking.  Wear sunscreen to prevent skin cancer.  Have a dental exam and cleaning every 6 months.  Yearly exams  See your health care team every year to talk about:  Any changes in your health.  Any medicines your care team has prescribed.  Preventive care, family planning, and ways to prevent chronic diseases.  Shots (vaccines)   HPV shots (up to age 26), if you've never had them before.  Hepatitis B shots (up to age 59), if you've never had them before.  COVID-19 shot: Get this shot when it's due.  Flu shot: Get a flu shot every year.  Tetanus shot: Get a tetanus shot every 10 years.  Pneumococcal, hepatitis A, and RSV shots: Ask your care team if you need these based on your risk.  Shingles shot (for age 50 and up).  General health tests  Diabetes screening:  Starting at age 35, Get screened for diabetes at least every 3 years.  If you are younger than age 35, ask your care team if you should be screened for diabetes.  Cholesterol test: At age 39, start having a cholesterol test every 5 years, or more often if advised.  Bone density scan (DEXA): At age 50, ask your care team if you should have this scan for osteoporosis (brittle bones).  Hepatitis C: Get tested at least once in your life.  STIs (sexually transmitted  infections)  Before age 24: Ask your care team if you should be screened for STIs.  After age 24: Get screened for STIs if you're at risk. You are at risk for STIs (including HIV) if:  You are sexually active with more than one person.  You don't use condoms every time.  You or a partner was diagnosed with a sexually transmitted infection.  If you are at risk for HIV, ask about PrEP medicine to prevent HIV.  Get tested for HIV at least once in your life, whether you are at risk for HIV or not.  Cancer screening tests  Cervical cancer screening: If you have a cervix, begin getting regular cervical cancer screening tests at age 21. Most people who have regular screenings with normal results can stop after age 65. Talk about this with your provider.  Breast cancer scan (mammogram): If you've ever had breasts, begin having regular mammograms starting at age 40. This is a scan to check for breast cancer.  Colon cancer screening: It is important to start screening for colon cancer at age 45.  Have a colonoscopy test every 10 years (or more often if you're at risk) Or, ask your provider about stool tests like a FIT test every year or Cologuard test every 3 years.  To learn more about your testing options, visit: https://www.[email or web address]/919713.pdf.  For help making a decision, visit: https://bit.ly/gx22178.  Prostate cancer screening test: If you have a prostate and are age 55 to 69, ask your provider if you would benefit from a yearly prostate cancer screening test.  Lung cancer screening: If you are a current or former smoker age 50 to 80, ask your care team if ongoing lung cancer screenings are right for you.  For informational purposes only. Not to replace the advice of your health care provider. Copyright   2023 [employer]. All rights reserved. Clinically reviewed by the [facility]. evOLED 285212 - REV 01/24.

## 2024-05-03 NOTE — LETTER
[facility] [place]  05/03/24  Patient: Sanaz Lange  YOB: 2000  Medical Record Number: [id number]                                                                                  Non-Opioid Controlled Substance Agreement    This is an agreement between you and your provider regarding safe and appropriate use of controlled substances prescribed by your care team. Controlled substances are?medicines that can cause physical and mental dependence (abuse).     There are strict laws about having and using these medicines. We here at [facility] are  committed to working with you in your efforts to get better. To support you in this work, we'll help you schedule regular office appointments for medicine refills. If we must cancel or change your appointment for any reason, we'll make sure you have enough medicine to last until your next appointment.     As a Provider, I will:   Listen carefully to your concerns while treating you with respect.   Recommend a treatment plan that I believe is in your best interest and may involve therapies other than medicine.    Talk with you often about the possible benefits and the risk of harm of any medicine that we prescribe for you.  Assess the safety of this medicine and check how well it works.    Provide a plan on how to taper (discontinue or go off) using this medicine if the decision is made to stop its use.      ::  As a Patient, I understand controlled substances:     Are prescribed by my care provider to help me function or work and manage my condition(s).?  Are strong medicines and can cause serious side effects.     Need to be taken exactly as prescribed.?Combining controlled substances with certain medicines or chemicals (such as illegal drugs, alcohol, sedatives, sleeping pills, and benzodiazepines) can be dangerous or even fatal.? If I stop taking my medicines suddenly, I may have severe withdrawal symptoms.     The risks,  benefits, and side effects of these medicine(s) were explained to me. I agree that:    I will take part in other treatments as advised by my care team. This may be psychiatry or counseling, physical therapy, behavioral therapy, group treatment or a referral to specialist.    I will keep all my appointments and understand this is part of the monitoring of controlled substances.?My care team may require an office visit for EVERY controlled substance refill. If I miss appointments or don t follow instructions, my care team may stop my medicine    I will take my medicines as prescribed. I will not change the dose or schedule unless my care team tells me to. There will be no refills if I run out early.      I may be asked to come to the clinic and complete a urine drug test or complete a pill count. If I don t give a urine sample or participate in a pill count, the care team may stop my medicine.    I will only receive controlled substance prescriptions from this clinic. If I am treated by another provider, I will tell them that I am taking controlled substances and that I have a treatment agreement with this provider. I will inform my [facility] care team within one business day if I am given a prescription for any controlled substance by another healthcare provider. My [facility] care team can contact other providers and pharmacists about my use of any medicines.    It is up to me to make sure that I don't run out of my medicines on weekends or holidays.?If my care team is willing to refill my prescription without a visit, I must request refills only during office hours. Refills may take up to 3 business days to process. I will use one pharmacy to fill all my controlled substance prescriptions. I will notify the clinic about any changes to my insurance or medicine availability.    I am responsible for my prescriptions. If the medicine/prescription is lost, stolen or destroyed, it will not be replaced.?I  also agree not to share controlled substance medicines with anyone.     I am aware I should not use any illegal or recreational drugs. I agree not to drink alcohol unless my care team says I can.     If I enroll in the [place] [facility] Cannabis program, I will tell my care team before my next refill.    I will tell my care team right away if I become pregnant, have a new medical problem treated outside of my regular clinic, or have a change in my medicines.     I understand that this medicine can affect my thinking, judgment and reaction time.? Alcohol and drugs affect the brain and body, which can affect the safety of my driving. Being under the influence of alcohol or drugs can affect my decision-making, behaviors, personal safety and the safety of others. Driving while impaired (DWI) can occur if a person is driving, operating or in physical control of a car, motorcycle, boat, snowmobile, ATV, motorbike, off-road vehicle or any other motor vehicle ([place] Statute 169A.20). I understand the risk if I choose to drive or operate any vehicle or machinery.    I understand that if I do not follow any of the conditions above, my prescriptions or treatment may be stopped or changed.   I agree that my provider, clinic care team and pharmacy may work with any city, state or federal law enforcement agency that investigates the misuse, sale or other diversion of my controlled medicine. I will allow my provider to discuss my care with, or share a copy of, this agreement with any other treating provider, pharmacy or emergency room where I receive care.     I have read this agreement and have asked questions about anything I did not understand.    ________________________________________________________  Patient Signature - Sanaz Lange     ___________________                   Date     ________________________________________________________  Provider Signature - TYRONE Gan CNP       ___________________                    Date     ________________________________________________________  Witness Signature (required if provider not present while patient signing)          ___________________                   Date

## 2024-05-03 NOTE — PROGRESS NOTES
Preventive Care Visit  [facility] [place]  TYRONE Gan CNP, Nurse Practitioner - Family  May 3, 2024      Assessment & Plan     Routine general medical examination at a health care facility  Well woman exam with breast exam and Pap smear completed today.    Labs today.    Will notify of lab results.   - REVIEW OF HEALTH MAINTENANCE PROTOCOL ORDERS    Mild recurrent major depression (H24)  No concerns.  Stable.  Wants to stay on same medications continue same medication this was refilled today.  Every 6-month med checks  - venlafaxine (EFFEXOR XR) 150 MG 24 hr capsule  Dispense: 90 capsule; Refill: 1  - lamoTRIgine (LAMICTAL) 100 MG tablet  Dispense: 90 tablet; Refill: 1  - hydrOXYzine HCl (ATARAX) 25 MG tablet  Dispense: 90 tablet; Refill: 2    Anxiety  - venlafaxine (EFFEXOR XR) 150 MG 24 hr capsule  Dispense: 90 capsule; Refill: 1  - lamoTRIgine (LAMICTAL) 100 MG tablet  Dispense: 90 tablet; Refill: 1  - hydrOXYzine HCl (ATARAX) 25 MG tablet  Dispense: 90 tablet; Refill: 2    Cold sore  Refill Valtrex as needed.  Recently filled.    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  - Urine Drug Screen Clinic    Encounter for surveillance of contraceptive pills  No concerns.  Stable.  Continue same medication this was refilled today.   - norgestim-eth estrad triphasic (TRI-LO-ASHTYN) 0.18/0.215/0.25 MG-25 MCG tablet  Dispense: 84 tablet; Refill: 4    Cervical cancer screening    - Pap Screen only - recommended age 21 - 24 years    Need for diphtheria-tetanus-pertussis (Tdap) vaccine    - TDAP 10-64Y (ADACEL,BOOSTRIX)    Hypovitaminosis D  Take vitamin D as needed; in particular in the winter months.      Screen for STD (sexually transmitted disease)    - NEISSERIA GONORRHOEA PCR  - CHLAMYDIA TRACHOMATIS PCR    Medication management   Yearly BMP and thyroid due to  "Lamictal.      BMI  Estimated body mass index is 30.47 kg/m  as calculated from the following:    Height as of this encounter: 1.646 m (5' 4.8\").    Weight as of this encounter: 82.6 kg (182 lb).   Weight management plan: Discussed healthy diet and exercise guidelines    Counseling  Appropriate preventive services were discussed with this patient.    Return in about 6 months (around 11/3/2024) for TDAP now; urine on way out-mood check, virtual follow up.       Jorden Yo is a 23 year old, presenting for the following:  Physical        5/3/2024     8:48 AM   Additional Questions   Roomed by José TALBOT   Accompanied by Self        Health Care Directive  Patient does not have a Health Care Directive or Living Will: Discussed advance care planning with patient; however, patient declined at this time.    HPI    Depression and Anxiety   How are you doing with your depression since your last visit? Improved   How are you doing with your anxiety since your last visit?  Improved   Are you having other symptoms that might be associated with depression or anxiety? No  Have you had a significant life event? OTHER: Moving back home from college - positive    Do you have any concerns with your use of alcohol or other drugs? 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  She has great improvement of her mood since increasing Lamictal to 100 mg.    Does take ADHD medication Adderall 20 mg XR on rare occasion.  Will update urine drug screen and contract today so she can inquire about this as needed.  Has not had in some time.    Social History     Tobacco Use    Smoking status: Never    Smokeless tobacco: Never   Vaping Use    Vaping status: Never Used   Substance Use Topics    Alcohol use: No    Drug use: No         8/21/2023     1:13 PM 1/22/2024     3:52 PM 5/2/2024     4:09 PM   PHQ   PHQ-9 Total Score 7 8 9   Q9: Thoughts of better off dead/self-harm past 2 weeks Not at all Not at all Not at " all         8/21/2023     1:14 PM 1/22/2024     3:53 PM 5/2/2024     4:09 PM   JUANJOSE-7 SCORE   Total Score 8 (mild anxiety) 7 (mild anxiety) 8 (mild anxiety)   Total Score 8 7 8           5/2/2024   General Health   How would you rate your overall physical health? Good   Feel stress (tense, anxious, or unable to sleep) Only a little   (!) STRESS CONCERN      5/2/2024   Nutrition   Three or more servings of calcium each day? Yes   Diet: Regular (no restrictions)   How many servings of fruit and vegetables per day? (!) 2-3   How many sweetened beverages each day? 0-1         5/2/2024   Exercise   Days per week of moderate/strenous exercise 2 days   Average minutes spent exercising at this level 30 min   (!) EXERCISE CONCERN      5/2/2024   Social Factors   Frequency of gathering with friends or relatives More than three times a week   Worry food won't last until get money to buy more No   Food not last or not have enough money for food? No   Do you have housing?  Yes   Are you worried about losing your housing? No   Lack of transportation? No   Unable to get utilities (heat,electricity)? No         5/2/2024   Dental   Dentist two times every year? Yes         5/2/2024   TB Screening   Were you born outside of the [place]? No       Today's PHQ-9 Score:       5/2/2024     4:09 PM   PHQ-9 SCORE   PHQ-9 Total Score MyChart 9 (Mild depression)   PHQ-9 Total Score 9         5/2/2024   Substance Use   Alcohol more than 3/day or more than 7/wk No   Do you use any other substances recreationally? No     Social History     Tobacco Use    Smoking status: Never    Smokeless tobacco: Never   Vaping Use    Vaping status: Never Used   Substance Use Topics    Alcohol use: No    Drug use: No         5/2/2024   STI Screening   New sexual partner(s) since last STI/HIV test? No     History of abnormal Pap smear: NO - age 21-29 PAP every 3 years recommended            5/2/2024   Contraception/Family Planning   Questions about contraception or  "family planning No     Reviewed and updated as needed this visit by Provider   Tobacco  Allergies  Meds  Problems  Med Hx  Surg Hx  Fam Hx            Constitutional, HEENT, cardiovascular, pulmonary, GI, , musculoskeletal, neuro, skin, endocrine and psych systems are negative, except as otherwise noted in the HPI.        Objective    Exam  /82 (BP Location: Left arm, Patient Position: Chair, Cuff Size: Adult Regular)   Pulse 93   Temp 97.9  F (36.6  C) (Tympanic)   Resp 12   Ht 1.646 m (5' 4.8\")   Wt 82.6 kg (182 lb)   LMP 04/28/2024 (Exact Date)   SpO2 99%   BMI 30.47 kg/m     Estimated body mass index is 30.47 kg/m  as calculated from the following:    Height as of this encounter: 1.646 m (5' 4.8\").    Weight as of this encounter: 82.6 kg (182 lb).    Physical Exam  GENERAL: alert and no distress  EYES: Eyes grossly normal to inspection, PERRL and conjunctivae and sclerae normal  HENT: ear canals and TM's normal, nose and mouth without ulcers or lesions  NECK: no adenopathy, no asymmetry, masses, or scars  RESP: lungs clear to auscultation - no rales, rhonchi or wheezes  BREAST: normal without masses, tenderness or nipple discharge and no palpable axillary masses or adenopathy  CV: regular rate and rhythm, normal S1 S2, no S3 or S4, no murmur, click or rub, no peripheral edema  ABDOMEN: soft, nontender, no hepatosplenomegaly, no masses and bowel sounds normal   (female) w/bimanual: normal female external genitalia, normal urethral meatus, normal vaginal mucosa, and normal cervix/adnexa/uterus without masses or discharge  MS: no gross musculoskeletal defects noted, no edema  SKIN: no suspicious lesions or rashes  NEURO: Normal strength and tone, mentation intact and speech normal  PSYCH: mentation appears normal, affect normal/bright         Signed Electronically by: TYRONE Gan CNP    Answers submitted by the patient for this visit:  Patient Health Questionnaire (Submitted on " 5/2/2024)  If you checked off any problems, how difficult have these problems made it for you to do your work, take care of things at home, or get along with other people?: Somewhat difficult  PHQ9 TOTAL SCORE: 9  JUANJOSE-7 (Submitted on 5/2/2024)  JUANJOSE 7 TOTAL SCORE: 8

## 2024-05-03 NOTE — LETTER
My Depression Action Plan  Name: Sanaz Lange   Date of Birth 2000  Date: 5/3/2024    My doctor: Marnie Parnell   My clinic: [address] [id number] [phone]            GREEN    ZONE   Good Control    What it looks like:   Things are going generally well. You have normal ups and downs. You may even feel depressed from time to time, but bad moods usually last less than a day.   What you need to do:  Continue to care for yourself (see self care plan)  Check your depression survival kit and update it as needed  Follow your physician s recommendations including any medication.  Do not stop taking medication unless you consult with your physician first.             YELLOW         ZONE Getting Worse    What it looks like:   Depression is starting to interfere with your life.   It may be hard to get out of bed; you may be starting to isolate yourself from others.  Symptoms of depression are starting to last most all day and this has happened for several days.   You may have suicidal thoughts but they are not constant.   What you need to do:     Call your care team. Your response to treatment will improve if you keep your care team informed of your progress. Yellow periods are signs an adjustment may need to be made.     Continue your self-care.  Just get dressed and ready for the day.  Don't give yourself time to talk yourself out of it.    Talk to someone in your support network.    Open up your Depression Self-Care Plan/Wellness Kit.             RED    ZONE Medical Alert - Get Help    What it looks like:   Depression is seriously interfering with your life.   You may experience these or other symptoms: You can t get out of bed most days, can t work or engage in other necessary activities, you have trouble taking care of basic hygiene, or basic responsibilities, thoughts of suicide or death that will not go away,  self-injurious behavior.     What you need to do:  Call your care team and request a same-day appointment. If they are not available (weekends or after hours) call your local crisis line, emergency room or 911.          Depression Self-Care Plan / Wellness Kit    Many people find that medication and therapy are helpful treatments for managing depression. In addition, making small changes to your everyday life can help to boost your mood and improve your wellbeing. Below are some tips for you to consider. Be sure to talk with your medical provider and/or behavioral health consultant if your symptoms are worsening or not improving.     Sleep   Sleep hygiene  means all of the habits that support good, restful sleep. It includes maintaining a consistent bedtime and wake time, using your bedroom only for sleeping or sex, and keeping the bedroom dark and free of distractions like a computer, smartphone, or television.     Develop a Healthy Routine  Maintain good hygiene. Get out of bed in the morning, make your bed, brush your teeth, take a shower, and get dressed. Don t spend too much time viewing media that makes you feel stressed. Find time to relax each day.    Exercise  Get some form of exercise every day. This will help reduce pain and release endorphins, the  feel good  chemicals in your brain. It can be as simple as just going for a walk or doing some gardening, anything that will get you moving.      Diet  Strive to eat healthy foods, including fruits and vegetables. Drink plenty of water. Avoid excessive sugar, caffeine, alcohol, and other mood-altering substances.     Stay Connected with Others  Stay in touch with friends and family members.    Manage Your Mood  Try deep breathing, massage therapy, biofeedback, or meditation. Take part in fun activities when you can. Try to find something to smile about each day.     Psychotherapy  Be open to working with a therapist if your provider recommends it.      Medication  Be sure to take your medication as prescribed. Most anti-depressants need to be taken every day. It usually takes several weeks for medications to work. Not all medicines work for all people. It is important to follow-up with your provider to make sure you have a treatment plan that is working for you. Do not stop your medication abruptly without first discussing it with your provider.    Crisis Resources   These hotlines are for both adults and children. They and are open 24 hours a day, 7 days a week unless noted otherwise.    National Suicide Prevention Lifeline   988 or 3-860-858-UALA (5742)    Crisis Text Line    www.crisistextline.org  Text HOME to 351285 from anywhere in the [place], anytime, about any type of crisis. A live, trained crisis counselor will receive the text and respond quickly.    [employer] Lifeline for LGBTQ Youth  A national crisis intervention and suicide lifeline for LGBTQ youth under 25. Provides a safe place to talk without judgement. Call 1-912.501.1637; text START to 477398 or visit www.thetrevorproject.org to talk to a trained counselor.    For Critical access hospital crisis numbers, visit the [facility] website at:  https://[place].gov/dhs/people-we-serve/adults/health-care/mental-health/resources/crisis-contacts.jsp

## 2024-05-04 LAB
C TRACH DNA SPEC QL NAA+PROBE: NEGATIVE
N GONORRHOEA DNA SPEC QL NAA+PROBE: NEGATIVE

## 2024-05-06 NOTE — RESULT ENCOUNTER NOTE
Dear Sanaz,    Here is a summary of your recent test results:    -All of your labs are normal.    Please call us at [phone] (or use [facility]) to address the above recommendations if needed.    Thank you for choosing [facility].  It was an honor and a privilege to participate in your care.       Healthy regards,    Marnie Parnell, MOHINI  [facility]

## 2024-05-08 LAB
BKR LAB AP GYN ADEQUACY: NORMAL
BKR LAB AP GYN INTERPRETATION: NORMAL
BKR LAB AP HPV REFLEX: NO
BKR LAB AP PREVIOUS ABNORMAL: NORMAL
PATH REPORT.COMMENTS IMP SPEC: NORMAL
PATH REPORT.COMMENTS IMP SPEC: NORMAL
PATH REPORT.RELEVANT HX SPEC: NORMAL

## 2024-08-08 DIAGNOSIS — F41.9 ANXIETY: ICD-10-CM

## 2024-08-08 DIAGNOSIS — F33.0 MILD RECURRENT MAJOR DEPRESSION (H): ICD-10-CM

## 2024-08-08 RX ORDER — LAMOTRIGINE 100 MG/1
100 TABLET ORAL DAILY
Qty: 90 TABLET | Refills: 1 | OUTPATIENT
Start: 2024-08-08

## 2024-08-08 RX ORDER — VENLAFAXINE HYDROCHLORIDE 150 MG/1
150 CAPSULE, EXTENDED RELEASE ORAL DAILY
Qty: 90 CAPSULE | Refills: 1 | OUTPATIENT
Start: 2024-08-08

## 2024-09-11 ENCOUNTER — MYC MEDICAL ADVICE (OUTPATIENT)
Dept: FAMILY MEDICINE | Facility: CLINIC | Age: 24
End: 2024-09-11
Payer: COMMERCIAL

## 2024-10-01 PROBLEM — F98.8 ATTENTION DEFICIT DISORDER: Status: ACTIVE | Noted: 2020-12-29

## 2024-11-02 ASSESSMENT — ANXIETY QUESTIONNAIRES: GAD7 TOTAL SCORE: 16

## 2024-11-02 ASSESSMENT — PATIENT HEALTH QUESTIONNAIRE - PHQ9: SUM OF ALL RESPONSES TO PHQ QUESTIONS 1-9: 18

## 2025-02-28 DIAGNOSIS — F33.0 MILD RECURRENT MAJOR DEPRESSION: ICD-10-CM

## 2025-02-28 DIAGNOSIS — F41.9 ANXIETY: ICD-10-CM

## 2025-02-28 NOTE — TELEPHONE ENCOUNTER
Clinic RN: Please investigate patient's chart or contact patient if the information cannot be found because patient should have run out of this medication on 11/2/24. Confirm patient is taking this medication as prescribed. Document findings and route refill encounter to provider for approval or denial.    Freya Devi, MARGARITAN, RN

## 2025-03-03 NOTE — TELEPHONE ENCOUNTER
Attempt # 1    Called #   Telephone Information:   Mobile [phone]         Left a non detailed VM     Saida Haynes RN, BSN  [facility]

## 2025-03-04 NOTE — TELEPHONE ENCOUNTER
PT calls back. She states she is still taking both of these medications, and that pharmacy has been refilling.   She states she will be scheduling her yearly appt soon, for May 2025.     Provider approval needed.

## 2025-03-05 NOTE — TELEPHONE ENCOUNTER
Questionnaires needed;  sent in [employer] awaiting response prior to next fill of medication.           Marnie Parnell, FNP-BC

## 2025-03-06 RX ORDER — VENLAFAXINE HYDROCHLORIDE 150 MG/1
150 CAPSULE, EXTENDED RELEASE ORAL DAILY
Qty: 90 CAPSULE | Refills: 0 | Status: SHIPPED | OUTPATIENT
Start: 2025-03-06

## 2025-03-06 RX ORDER — LAMOTRIGINE 100 MG/1
100 TABLET ORAL DAILY
Qty: 90 TABLET | Refills: 0 | Status: SHIPPED | OUTPATIENT
Start: 2025-03-06

## 2025-03-06 NOTE — TELEPHONE ENCOUNTER
1/22/2024     3:52 PM 5/2/2024     4:09 PM 3/6/2025     1:23 PM   PHQ   PHQ-9 Total Score 8 9 11    Q9: Thoughts of better off dead/self-harm past 2 weeks Not at all Not at all Not at all       Patient-reported          1/22/2024     3:53 PM 5/2/2024     4:09 PM 3/6/2025     1:23 PM   JUANJOSE-7 SCORE   Total Score 7 (mild anxiety) 8 (mild anxiety) 10 (moderate anxiety)   Total Score 7 8 10        Patient-reported      Follow up on numbers increased.   Appt before next fill.             Marnie Parnell, FNP-BC

## 2025-03-06 NOTE — TELEPHONE ENCOUNTER
Separate Mychart was sent to patient yesterday, and has not been read.     Attempt # 2    Called # [phone]     Left a non detailed VM to call back at [phone] and ask for any available Triage Nurse.    KAROLINA CURRY RN on 3/6/2025 at 12:04 PM   [facility]

## 2025-03-25 NOTE — RESULT ENCOUNTER NOTE
Problem: Safety - Adult  Goal: Free from fall injury  3/24/2025 2234 by Jerrica Saavedra RN  Outcome: Progressing  3/24/2025 1130 by Radha Muller RN  Outcome: Progressing     Problem: Skin/Tissue Integrity  Goal: Skin integrity remains intact  Description: 1.  Monitor for areas of redness and/or skin breakdown  2.  Assess vascular access sites hourly  3.  Every 4-6 hours minimum:  Change oxygen saturation probe site  4.  Every 4-6 hours:  If on nasal continuous positive airway pressure, respiratory therapy assess nares and determine need for appliance change or resting period  3/24/2025 2234 by Jerrica Saavedra RN  Outcome: Progressing  3/24/2025 1130 by Radha Muller RN  Outcome: Progressing  Flowsheets (Taken 3/24/2025 0748)  Skin Integrity Remains Intact: Monitor for areas of redness and/or skin breakdown     Problem: Pain  Goal: Verbalizes/displays adequate comfort level or baseline comfort level  3/24/2025 2234 by Jerrica Saavedra RN  Outcome: Progressing  Flowsheets (Taken 3/24/2025 2234)  Verbalizes/displays adequate comfort level or baseline comfort level: Implement non-pharmacological measures as appropriate and evaluate response  3/24/2025 1130 by Radha Muller RN  Outcome: Progressing  Flowsheets  Taken 3/23/2025 2330 by Jazz Raman RN  Verbalizes/displays adequate comfort level or baseline comfort level:   Encourage patient to monitor pain and request assistance   Assess pain using appropriate pain scale   Implement non-pharmacological measures as appropriate and evaluate response   Administer analgesics based on type and severity of pain and evaluate response  Taken 3/23/2025 2300 by Jazz Raman RN  Verbalizes/displays adequate comfort level or baseline comfort level:   Encourage patient to monitor pain and request assistance   Assess pain using appropriate pain scale   Administer analgesics based on type and severity of pain and evaluate response     Problem: Discharge Planning  Goal:  Sanaz  I have reviewed your recent labs. Here are the results:    -TSH (thyroid stimulating hormone) level is normal which indicates normal thyroid function.  -Vitamin D level is low and oral supplementation should be started.  ADVISE: starting over the counter Vitamin D3  2000 IU - 3 tabs (6000 IU) daily for 6 weeks and then 2000 IU daily to maintain levels.  Then in 2 months, please schedule a lab only appointment to recheck your Vitamin D levels.     If you have any questions please do not hesitate to contact our office via phone ([phone]) or MyChart.    Blessing Wadsworth, MS, PA-C  [facility]

## 2025-04-29 ENCOUNTER — PATIENT OUTREACH (OUTPATIENT)
Dept: CARE COORDINATION | Facility: CLINIC | Age: 25
End: 2025-04-29
Payer: COMMERCIAL

## 2025-04-30 SDOH — HEALTH STABILITY: PHYSICAL HEALTH: ON AVERAGE, HOW MANY DAYS PER WEEK DO YOU ENGAGE IN MODERATE TO STRENUOUS EXERCISE (LIKE A BRISK WALK)?: 2 DAYS

## 2025-04-30 SDOH — HEALTH STABILITY: PHYSICAL HEALTH: ON AVERAGE, HOW MANY MINUTES DO YOU ENGAGE IN EXERCISE AT THIS LEVEL?: 30 MIN

## 2025-04-30 ASSESSMENT — ANXIETY QUESTIONNAIRES
5. BEING SO RESTLESS THAT IT IS HARD TO SIT STILL: MORE THAN HALF THE DAYS
7. FEELING AFRAID AS IF SOMETHING AWFUL MIGHT HAPPEN: NOT AT ALL
7. FEELING AFRAID AS IF SOMETHING AWFUL MIGHT HAPPEN: NOT AT ALL
2. NOT BEING ABLE TO STOP OR CONTROL WORRYING: SEVERAL DAYS
GAD7 TOTAL SCORE: 6
GAD7 TOTAL SCORE: 6
1. FEELING NERVOUS, ANXIOUS, OR ON EDGE: NOT AT ALL
8. IF YOU CHECKED OFF ANY PROBLEMS, HOW DIFFICULT HAVE THESE MADE IT FOR YOU TO DO YOUR WORK, TAKE CARE OF THINGS AT HOME, OR GET ALONG WITH OTHER PEOPLE?: SOMEWHAT DIFFICULT
3. WORRYING TOO MUCH ABOUT DIFFERENT THINGS: SEVERAL DAYS
GAD7 TOTAL SCORE: 6
6. BECOMING EASILY ANNOYED OR IRRITABLE: MORE THAN HALF THE DAYS
IF YOU CHECKED OFF ANY PROBLEMS ON THIS QUESTIONNAIRE, HOW DIFFICULT HAVE THESE PROBLEMS MADE IT FOR YOU TO DO YOUR WORK, TAKE CARE OF THINGS AT HOME, OR GET ALONG WITH OTHER PEOPLE: SOMEWHAT DIFFICULT
4. TROUBLE RELAXING: NOT AT ALL

## 2025-04-30 ASSESSMENT — SOCIAL DETERMINANTS OF HEALTH (SDOH): HOW OFTEN DO YOU GET TOGETHER WITH FRIENDS OR RELATIVES?: ONCE A WEEK

## 2025-05-05 ENCOUNTER — OFFICE VISIT (OUTPATIENT)
Dept: FAMILY MEDICINE | Facility: CLINIC | Age: 25
End: 2025-05-05
Attending: NURSE PRACTITIONER
Payer: COMMERCIAL

## 2025-05-05 VITALS
OXYGEN SATURATION: 99 % | TEMPERATURE: 97.9 F | BODY MASS INDEX: 32.44 KG/M2 | RESPIRATION RATE: 13 BRPM | HEART RATE: 92 BPM | WEIGHT: 190 LBS | SYSTOLIC BLOOD PRESSURE: 132 MMHG | DIASTOLIC BLOOD PRESSURE: 80 MMHG | HEIGHT: 64 IN

## 2025-05-05 DIAGNOSIS — F33.0 MILD RECURRENT MAJOR DEPRESSION: ICD-10-CM

## 2025-05-05 DIAGNOSIS — Z13.29 SCREENING FOR THYROID DISORDER: ICD-10-CM

## 2025-05-05 DIAGNOSIS — B00.1 COLD SORE: ICD-10-CM

## 2025-05-05 DIAGNOSIS — F41.9 ANXIETY: ICD-10-CM

## 2025-05-05 DIAGNOSIS — Z00.00 ROUTINE GENERAL MEDICAL EXAMINATION AT A HEALTH CARE FACILITY: Primary | ICD-10-CM

## 2025-05-05 DIAGNOSIS — Z13.1 SCREENING FOR DIABETES MELLITUS: ICD-10-CM

## 2025-05-05 DIAGNOSIS — Z79.899 MEDICATION MANAGEMENT: Primary | ICD-10-CM

## 2025-05-05 DIAGNOSIS — F33.1 MAJOR DEPRESSIVE DISORDER, RECURRENT EPISODE, MODERATE (H): ICD-10-CM

## 2025-05-05 DIAGNOSIS — L98.9 SKIN LESION: ICD-10-CM

## 2025-05-05 DIAGNOSIS — Z00.00 ROUTINE GENERAL MEDICAL EXAMINATION AT A HEALTH CARE FACILITY: ICD-10-CM

## 2025-05-05 DIAGNOSIS — Z13.0 SCREENING FOR DEFICIENCY ANEMIA: ICD-10-CM

## 2025-05-05 DIAGNOSIS — Z30.41 ENCOUNTER FOR SURVEILLANCE OF CONTRACEPTIVE PILLS: ICD-10-CM

## 2025-05-05 DIAGNOSIS — Z79.899 MEDICATION MANAGEMENT: ICD-10-CM

## 2025-05-05 LAB
ERYTHROCYTE [DISTWIDTH] IN BLOOD BY AUTOMATED COUNT: 13.7 % (ref 10–15)
HCT VFR BLD AUTO: 36.3 % (ref 35–47)
HGB BLD-MCNC: 12.1 G/DL (ref 11.7–15.7)
MCH RBC QN AUTO: 29.2 PG (ref 26.5–33)
MCHC RBC AUTO-ENTMCNC: 33.3 G/DL (ref 31.5–36.5)
MCV RBC AUTO: 88 FL (ref 78–100)
PLATELET # BLD AUTO: 406 10E3/UL (ref 150–450)
RBC # BLD AUTO: 4.15 10E6/UL (ref 3.8–5.2)
WBC # BLD AUTO: 10.5 10E3/UL (ref 4–11)

## 2025-05-05 PROCEDURE — 85027 COMPLETE CBC AUTOMATED: CPT | Performed by: NURSE PRACTITIONER

## 2025-05-05 PROCEDURE — 99395 PREV VISIT EST AGE 18-39: CPT | Performed by: NURSE PRACTITIONER

## 2025-05-05 PROCEDURE — G2211 COMPLEX E/M VISIT ADD ON: HCPCS | Performed by: NURSE PRACTITIONER

## 2025-05-05 PROCEDURE — 80053 COMPREHEN METABOLIC PANEL: CPT | Performed by: NURSE PRACTITIONER

## 2025-05-05 PROCEDURE — 36415 COLL VENOUS BLD VENIPUNCTURE: CPT | Performed by: NURSE PRACTITIONER

## 2025-05-05 PROCEDURE — 99214 OFFICE O/P EST MOD 30 MIN: CPT | Mod: 25 | Performed by: NURSE PRACTITIONER

## 2025-05-05 PROCEDURE — 3075F SYST BP GE 130 - 139MM HG: CPT | Performed by: NURSE PRACTITIONER

## 2025-05-05 PROCEDURE — 84443 ASSAY THYROID STIM HORMONE: CPT | Performed by: NURSE PRACTITIONER

## 2025-05-05 PROCEDURE — 96127 BRIEF EMOTIONAL/BEHAV ASSMT: CPT | Performed by: NURSE PRACTITIONER

## 2025-05-05 PROCEDURE — 3079F DIAST BP 80-89 MM HG: CPT | Performed by: NURSE PRACTITIONER

## 2025-05-05 RX ORDER — LAMOTRIGINE 100 MG/1
100 TABLET ORAL DAILY
Qty: 90 TABLET | Refills: 4 | Status: SHIPPED | OUTPATIENT
Start: 2025-05-05

## 2025-05-05 RX ORDER — NORGESTIMATE AND ETHINYL ESTRADIOL 7DAYSX3 LO
1 KIT ORAL DAILY
Qty: 84 TABLET | Refills: 4 | Status: SHIPPED | OUTPATIENT
Start: 2025-05-05

## 2025-05-05 RX ORDER — VALACYCLOVIR HYDROCHLORIDE 1 G/1
2000 TABLET, FILM COATED ORAL 2 TIMES DAILY
Qty: 12 TABLET | Refills: 3 | Status: SHIPPED | OUTPATIENT
Start: 2025-05-05 | End: 2025-05-06

## 2025-05-05 RX ORDER — VENLAFAXINE HYDROCHLORIDE 150 MG/1
150 CAPSULE, EXTENDED RELEASE ORAL DAILY
Qty: 90 CAPSULE | Refills: 1 | Status: SHIPPED | OUTPATIENT
Start: 2025-05-05

## 2025-05-05 RX ORDER — BACLOFEN 20 MG
TABLET ORAL
COMMUNITY
Start: 2024-04-30

## 2025-05-05 RX ORDER — ATOGEPANT 60 MG/1
TABLET ORAL
Status: CANCELLED | OUTPATIENT
Start: 2025-05-05

## 2025-05-05 RX ORDER — HYDROXYZINE HYDROCHLORIDE 25 MG/1
25 TABLET, FILM COATED ORAL 2 TIMES DAILY PRN
Qty: 90 TABLET | Refills: 2 | Status: SHIPPED | OUTPATIENT
Start: 2025-05-05

## 2025-05-05 NOTE — PROGRESS NOTES
"Preventive Care Visit  [facility]  TYRONE Gan CNP, Nurse Practitioner - Family  May 5, 2025      Assessment & Plan     Mild recurrent major depression  ***    Anxiety  ***    Cold sore  ***    Medication management  ***    Screening for diabetes mellitus  ***      Patient has been advised of split billing requirements and indicates understanding: Yes        BMI  Estimated body mass index is 32.24 kg/m  as calculated from the following:    Height as of this encounter: 1.635 m (5' 4.37\").    Weight as of this encounter: 86.2 kg (190 lb).   {Weight Management Plan needed for ACO:410363}    Counseling  Appropriate preventive services were addressed with this patient via screening, questionnaire, or discussion as appropriate for fall prevention, nutrition, physical activity, Tobacco-use cessation, social engagement, weight loss and cognition.  Checklist reviewing preventive services available has been given to the patient.  Reviewed patient's diet, addressing concerns and/or questions.   She is at risk for lack of exercise and has been provided with information to increase physical activity for the benefit of her well-being.   She is at risk for psychosocial distress and has been provided with information to reduce risk.   The patient's PHQ-9 score is consistent with moderate depression. She was provided with information regarding depression.       {Follow-up (Optional):367704}    Jorden Yo is a 24 year old, presenting for the following:  Physical        5/5/2025     4:56 PM   Additional Questions   Roomed by José CMA   Accompanied by Self       HPI    Moles - 2 / 1 skin tag - would like removed.  Left underarm bra line -- will irritate and bleed - more of a skin tag  Middle back - upper -- itches - very raised  Gluteal Cleft -  irritated by underwear.      Depression and Anxiety   How are you doing with your depression since your last visit? Worsened   How are you doing with " your anxiety since your last visit?  Improved   Are you having other symptoms that might be associated with depression or anxiety? Yes:  not wanting to do anything, disassociation, laying in bed all the time   Have you had a significant life event? OTHER: Moved back in with Mom, positive is she does get to see finicholas more often - they live separately but are saving to buy a place     Do you have any concerns with your use of alcohol or other drugs? No    Social History     Tobacco Use    Smoking status: Never    Smokeless tobacco: Never   Vaping Use    Vaping status: Never Used   Substance Use Topics    Alcohol use: Yes     Comment: twice a month at most    Drug use: No         5/2/2024     4:09 PM 3/6/2025     1:23 PM 4/30/2025     1:16 PM   PHQ   PHQ-9 Total Score 9 11  10    Q9: Thoughts of better off dead/self-harm past 2 weeks Not at all Not at all Not at all       Patient-reported         5/2/2024     4:09 PM 3/6/2025     1:23 PM 4/30/2025     1:17 PM   JUANJOSE-7 SCORE   Total Score 8 (mild anxiety) 10 (moderate anxiety) 6 (mild anxiety)   Total Score 8 10  6        Patient-reported       Migraine   Since your last clinic visit, how have your headaches changed?  No change   How often are you getting headaches or migraines? Little more recently with stress, weather and if gets too hot too quickly   Are you able to do normal daily activities when you have a migraine? Yes  Are you taking rescue/relief medications? (Select all that apply) Other: Excedrin Migraine if gets a HA  How helpful is your rescue/relief medication?  I get total relief 95% of the time  Are you taking any medications to prevent migraines? (Select all that apply)  Qulipta  In the past 4 weeks, how often have you gone to urgent care or the emergency room because of your headaches?  0    Sees neurology      Advance Care Planning    Discussed advance care planning with patient; however, patient declined at this time.        4/30/2025   [facility]    How would you rate your overall physical health? Good   Feel stress (tense, anxious, or unable to sleep) To some extent   (!) STRESS CONCERN      4/30/2025   Nutrition   Three or more servings of calcium each day? (!) NO   Diet: Regular (no restrictions)   How many servings of fruit and vegetables per day? (!) 0-1   How many sweetened beverages each day? 0-1         4/30/2025   Exercise   Days per week of moderate/strenous exercise 2 days   Average minutes spent exercising at this level 30 min   (!) EXERCISE CONCERN      4/30/2025   Social Factors   Frequency of gathering with friends or relatives Once a week   Worry food won't last until get money to buy more No   Food not last or not have enough money for food? No   Do you have housing? (Housing is defined as stable permanent housing and does not include staying outside in a car, in a tent, in an abandoned building, in an overnight shelter, or couch-surfing.) Yes   Are you worried about losing your housing? No   Lack of transportation? No   Unable to get utilities (heat,electricity)? No         4/30/2025   Dental   Dentist two times every year? Yes       Today's PHQ-9 Score:       4/30/2025     1:16 PM   PHQ-9 SCORE   PHQ-9 Total Score MyChart 10 (Moderate depression)   PHQ-9 Total Score 10        Patient-reported         4/30/2025   Substance Use   Alcohol more than 3/day or more than 7/wk No   Do you use any other substances recreationally? No     Social History     Tobacco Use    Smoking status: Never    Smokeless tobacco: Never   Vaping Use    Vaping status: Never Used   Substance Use Topics    Alcohol use: Yes     Comment: twice a month at most    Drug use: No         4/30/2025   STI Screening   New sexual partner(s) since last STI/HIV test? No     History of abnormal Pap smear: No - age 21-29 PAP every 3 years recommended        5/3/2024     9:34 AM   PAP / HPV   PAP Negative for Intraepithelial Lesion or Malignancy (NILM)            4/30/2025  "  Contraception/Family Planning   Questions about contraception or family planning No     Reviewed and updated as needed this visit by Provider   Tobacco  Allergies  Meds  Problems  Med Hx  Surg Hx  Fam Hx            Review of Systems  Constitutional, neuro, ENT, endocrine, pulmonary, cardiac, gastrointestinal, genitourinary, musculoskeletal, integument and psychiatric systems are negative, except as otherwise noted.     Objective    Exam  /80 (BP Location: Left arm, Patient Position: Chair, Cuff Size: Adult Regular)   Pulse 92   Temp 97.9  F (36.6  C) (Tympanic)   Resp 13   Ht 1.635 m (5' 4.37\")   Wt 86.2 kg (190 lb)   LMP 04/28/2025 (Exact Date)   SpO2 99%   BMI 32.24 kg/m     Estimated body mass index is 32.24 kg/m  as calculated from the following:    Height as of this encounter: 1.635 m (5' 4.37\").    Weight as of this encounter: 86.2 kg (190 lb).    Physical Exam  {Exam Choices (Optional):375272}         Signed Electronically by: TYRONE Gan CNP    Answers submitted by the patient for this visit:  Patient Health Questionnaire (Submitted on 4/30/2025)  If you checked off any problems, how difficult have these problems made it for you to do your work, take care of things at home, or get along with other people?: Somewhat difficult  PHQ9 TOTAL SCORE: 10  Patient Health Questionnaire (G7) (Submitted on 4/30/2025)  JUANJOSE 7 TOTAL SCORE: 6    " Once a week   Worry food won't last until get money to buy more No   Food not last or not have enough money for food? No   Do you have housing? (Housing is defined as stable permanent housing and does not include staying outside in a car, in a tent, in an abandoned building, in an overnight shelter, or couch-surfing.) Yes   Are you worried about losing your housing? No   Lack of transportation? No   Unable to get utilities (heat,electricity)? No         4/30/2025   Dental   Dentist two times every year? Yes       Today's PHQ-9 Score:       4/30/2025     1:16 PM   PHQ-9 SCORE   PHQ-9 Total Score MyChart 10 (Moderate depression)   PHQ-9 Total Score 10        Patient-reported         4/30/2025   Substance Use   Alcohol more than 3/day or more than 7/wk No   Do you use any other substances recreationally? No     Social History     Tobacco Use    Smoking status: Never    Smokeless tobacco: Never   Vaping Use    Vaping status: Never Used   Substance Use Topics    Alcohol use: Yes     Comment: twice a month at most    Drug use: No         4/30/2025   STI Screening   New sexual partner(s) since last STI/HIV test? No     History of abnormal Pap smear: No - age 21-29 PAP every 3 years recommended        5/3/2024     9:34 AM   PAP / HPV   PAP Negative for Intraepithelial Lesion or Malignancy (NILM)            4/30/2025   Contraception/Family Planning   Questions about contraception or family planning No     Reviewed and updated as needed this visit by Provider   Tobacco  Allergies  Meds  Problems  Med Hx  Surg Hx  Fam Hx            Review of Systems  Constitutional, neuro, ENT, endocrine, pulmonary, cardiac, gastrointestinal, genitourinary, musculoskeletal, integument and psychiatric systems are negative, except as otherwise noted.     Objective    Exam  /80 (BP Location: Left arm, Patient Position: Chair, Cuff Size: Adult Regular)   Pulse 92   Temp 97.9  F (36.6  C) (Tympanic)   Resp 13   Ht 1.635 m (5'  "4.37\")   Wt 86.2 kg (190 lb)   LMP 04/28/2025 (Exact Date)   SpO2 99%   BMI 32.24 kg/m     Estimated body mass index is 32.24 kg/m  as calculated from the following:    Height as of this encounter: 1.635 m (5' 4.37\").    Weight as of this encounter: 86.2 kg (190 lb).    Physical Exam  GENERAL: alert and no distress  EYES: Eyes grossly normal to inspection, PERRL and conjunctivae and sclerae normal  HENT: ear canals and TM's normal, nose and mouth without ulcers or lesions  NECK: no adenopathy, no asymmetry, masses, or scars  RESP: lungs clear to auscultation - no rales, rhonchi or wheezes  CV: regular rate and rhythm, normal S1 S2, no S3 or S4, no murmur, click or rub, no peripheral edema  ABDOMEN: soft, nontender, no hepatosplenomegaly, no masses and bowel sounds normal  MS: no gross musculoskeletal defects noted, no edema  SKIN: no suspicious rashes, Left lateral chest/armpit skin tag  Middle back - upper raised irregular papule. Gluteal Cleft -raised purplish brown papule with hair growing out of it   NEURO: Normal strength and tone, mentation intact and speech normal  PSYCH: mentation appears normal, affect normal/bright         Signed Electronically by: TYRONE Gan CNP    Answers submitted by the patient for this visit:  Patient Health Questionnaire (Submitted on 4/30/2025)  If you checked off any problems, how difficult have these problems made it for you to do your work, take care of things at home, or get along with other people?: Somewhat difficult  PHQ9 TOTAL SCORE: 10  Patient Health Questionnaire (G7) (Submitted on 4/30/2025)  JUANJOSE 7 TOTAL SCORE: 6    "

## 2025-05-05 NOTE — PATIENT INSTRUCTIONS
Patient Education   Preventive Care Advice   This is general advice given by our system to help you stay healthy. However, your care team may have specific advice just for you. Please talk to your care team about your preventive care needs.  Nutrition  Eat 5 or more servings of fruits and vegetables each day.  Try wheat bread, brown rice and whole grain pasta (instead of white bread, rice, and pasta).  Get enough calcium and vitamin D. Check the label on foods and aim for 100% of the RDA (recommended daily allowance).  Lifestyle  Exercise at least 150 minutes each week  (30 minutes a day, 5 days a week).  Do muscle strengthening activities 2 days a week. These help control your weight and prevent disease.  No smoking.  Wear sunscreen to prevent skin cancer.  Have a dental exam and cleaning every 6 months.  Yearly exams  See your health care team every year to talk about:  Any changes in your health.  Any medicines your care team has prescribed.  Preventive care, family planning, and ways to prevent chronic diseases.  Shots (vaccines)   HPV shots (up to age 26), if you've never had them before.  Hepatitis B shots (up to age 59), if you've never had them before.  COVID-19 shot: Get this shot when it's due.  Flu shot: Get a flu shot every year.  Tetanus shot: Get a tetanus shot every 10 years.  Pneumococcal, hepatitis A, and RSV shots: Ask your care team if you need these based on your risk.  Shingles shot (for age 50 and up)  General health tests  Diabetes screening:  Starting at age 35, Get screened for diabetes at least every 3 years.  If you are younger than age 35, ask your care team if you should be screened for diabetes.  Cholesterol test: At age 39, start having a cholesterol test every 5 years, or more often if advised.  Bone density scan (DEXA): At age 50, ask your care team if you should have this scan for osteoporosis (brittle bones).  Hepatitis C: Get tested at least once in your life.  STIs (sexually  transmitted infections)  Before age 24: Ask your care team if you should be screened for STIs.  After age 24: Get screened for STIs if you're at risk. You are at risk for STIs (including HIV) if:  You are sexually active with more than one person.  You don't use condoms every time.  You or a partner was diagnosed with a sexually transmitted infection.  If you are at risk for HIV, ask about PrEP medicine to prevent HIV.  Get tested for HIV at least once in your life, whether you are at risk for HIV or not.  Cancer screening tests  Cervical cancer screening: If you have a cervix, begin getting regular cervical cancer screening tests starting at age 21.  Breast cancer scan (mammogram): If you've ever had breasts, begin having regular mammograms starting at age 40. This is a scan to check for breast cancer.  Colon cancer screening: It is important to start screening for colon cancer at age 45.  Have a colonoscopy test every 10 years (or more often if you're at risk) Or, ask your provider about stool tests like a FIT test every year or Cologuard test every 3 years.  To learn more about your testing options, visit:   .  For help making a decision, visit:   https://bit.ly/nl48354.  Prostate cancer screening test: If you have a prostate, ask your care team if a prostate cancer screening test (PSA) at age 55 is right for you.  Lung cancer screening: If you are a current or former smoker ages 50 to 80, ask your care team if ongoing lung cancer screenings are right for you.  For informational purposes only. Not to replace the advice of your health care provider. Copyright   2023 [facility]. All rights reserved. Clinically reviewed by the [facility] Transitions Program. [employer] 885158 - REV 01/24.  Learning About Stress  What is stress?     Stress is your body's response to a hard situation. Your body can have a physical, emotional, or mental response. Stress is a fact of life for most people, and it  affects everyone differently. What causes stress for you may not be stressful for someone else.  A lot of things can cause stress. You may feel stress when you go on a job interview, take a test, or run a race. This kind of short-term stress is normal and even useful. It can help you if you need to work hard or react quickly. For example, stress can help you finish an important job on time.  Long-term stress is caused by ongoing stressful situations or events. Examples of long-term stress include long-term health problems, ongoing problems at work, or conflicts in your family. Long-term stress can harm your health.  How does stress affect your health?  When you are stressed, your body responds as though you are in danger. It makes hormones that speed up your heart, make you breathe faster, and give you a burst of energy. This is called the fight-or-flight stress response. If the stress is over quickly, your body goes back to normal and no harm is done.  But if stress happens too often or lasts too long, it can have bad effects. Long-term stress can make you more likely to get sick, and it can make symptoms of some diseases worse. If you tense up when you are stressed, you may develop neck, shoulder, or low back pain. Stress is linked to high blood pressure and heart disease.  Stress also harms your emotional health. It can make you preciado, tense, or depressed. Your relationships may suffer, and you may not do well at work or school.  What can you do to manage stress?  You can try these things to help manage stress:   Do something active. Exercise or activity can help reduce stress. Walking is a great way to get started. Even everyday activities such as housecleaning or yard work can help.  Try yoga or adalgisa chi. These techniques combine exercise and meditation. You may need some training at first to learn them.  Do something you enjoy. For example, listen to music or go to a movie. Practice your hobby or do volunteer  "work.  Meditate. This can help you relax, because you are not worrying about what happened before or what may happen in the future.  Do guided imagery. Imagine yourself in any setting that helps you feel calm. You can use online videos, books, or a teacher to guide you.  Do breathing exercises. For example:  From a standing position, bend forward from the waist with your knees slightly bent. Let your arms dangle close to the floor.  Breathe in slowly and deeply as you return to a standing position. Roll up slowly and lift your head last.  Hold your breath for just a few seconds in the standing position.  Breathe out slowly and bend forward from the waist.  Let your feelings out. Talk, laugh, cry, and express anger when you need to. Talking with supportive friends or family, a counselor, or a cameron leader about your feelings is a healthy way to relieve stress. Avoid discussing your feelings with people who make you feel worse.  Write. It may help to write about things that are bothering you. This helps you find out how much stress you feel and what is causing it. When you know this, you can find better ways to cope.  What can you do to prevent stress?  You might try some of these things to help prevent stress:  Manage your time. This helps you find time to do the things you want and need to do.  Get enough sleep. Your body recovers from the stresses of the day while you are sleeping.  Get support. Your family, friends, and community can make a difference in how you experience stress.  Limit your news feed. Avoid or limit time on social media or news that may make you feel stressed.  Do something active. Exercise or activity can help reduce stress. Walking is a great way to get started.  Where can you learn more?  Go to https://www.[employer].net/patiented  Enter N032 in the search box to learn more about \"Learning About Stress.\"  Current as of: October 24, 2024  Content Version: 14.4 2024-2025 [employer].   Care instructions adapted under license by your healthcare professional. If you have questions about a medical condition or this instruction, always ask your healthcare professional. [employer] disclaims any warranty or liability for your use of this information.    Learning About Depression Screening  What is depression screening?  Depression screening is a way to see if you have depression symptoms. It may be done by a doctor or counselor. It's often part of a routine checkup. That's because your mental health is just as important as your physical health.  Depression is a mental health condition that affects how you feel, think, and act. You may:  Have less energy.  Lose interest in your daily activities.  Feel sad and grouchy for a long time.  Depression is very common. It affects people of all ages.  Many things can lead to depression. Some people become depressed after they have a stroke or find out they have a major illness like cancer or heart disease. The death of a loved one or a breakup may lead to depression. It can run in families. Most experts believe that a combination of inherited genes and stressful life events can cause it.  What happens during screening?  You may be asked to fill out a form about your depression symptoms. You and the doctor will discuss your answers. The doctor may ask you more questions to learn more about how you think, act, and feel.  What happens after screening?  If you have symptoms of depression, your doctor will talk to you about your options.  Doctors usually treat depression with medicines or counseling. Often, combining the two works best. Many people don't get help because they think that they'll get over the depression on their own. But people with depression may not get better unless they get treatment.  The cause of depression is not well understood. There may be many factors involved. But if you have depression, it's not your fault.  A serious  "symptom of depression is thinking about death or suicide. If you or someone you care about talks about this or about feeling hopeless, get help right away.  It's important to know that depression can be treated. Medicine, counseling, and self-care may help.  Where can you learn more?  Go to https://www.[employer].net/patiented  Enter T185 in the search box to learn more about \"Learning About Depression Screening.\"  Current as of: July 31, 2024  Content Version: 14.4    3937-4505 [employer].   Care instructions adapted under license by your healthcare professional. If you have questions about a medical condition or this instruction, always ask your healthcare professional. [employer] disclaims any warranty or liability for your use of this information.       "

## 2025-05-06 LAB
ALBUMIN SERPL BCG-MCNC: 4.3 G/DL (ref 3.5–5.2)
ALP SERPL-CCNC: 62 U/L (ref 40–150)
ALT SERPL W P-5'-P-CCNC: 17 U/L (ref 0–50)
ANION GAP SERPL CALCULATED.3IONS-SCNC: 11 MMOL/L (ref 7–15)
AST SERPL W P-5'-P-CCNC: 24 U/L (ref 0–45)
BILIRUB SERPL-MCNC: <0.2 MG/DL
BUN SERPL-MCNC: 12 MG/DL (ref 6–20)
CALCIUM SERPL-MCNC: 9.6 MG/DL (ref 8.8–10.4)
CHLORIDE SERPL-SCNC: 104 MMOL/L (ref 98–107)
CREAT SERPL-MCNC: 0.67 MG/DL (ref 0.51–0.95)
EGFRCR SERPLBLD CKD-EPI 2021: >90 ML/MIN/1.73M2
GLUCOSE SERPL-MCNC: 103 MG/DL (ref 70–99)
HCO3 SERPL-SCNC: 23 MMOL/L (ref 22–29)
POTASSIUM SERPL-SCNC: 4.7 MMOL/L (ref 3.4–5.3)
PROT SERPL-MCNC: 7.3 G/DL (ref 6.4–8.3)
SODIUM SERPL-SCNC: 138 MMOL/L (ref 135–145)
TSH SERPL DL<=0.005 MIU/L-ACNC: 0.67 UIU/ML (ref 0.3–4.2)

## 2025-05-10 ENCOUNTER — RESULTS FOLLOW-UP (OUTPATIENT)
Dept: FAMILY MEDICINE | Facility: CLINIC | Age: 25
End: 2025-05-10

## 2025-05-10 NOTE — RESULT ENCOUNTER NOTE
Dear Sanaz,     Here is a summary of your recent test results:    -Glucose is mildly elevated but you were nonfasting and this is okay..All others labs are normal.    For additional lab test information, [email or web address] is an excellent reference.    In addition, here is a list of due or overdue Health Maintenance reminders:    Meningitis B Vaccine(1 of 2 - Standard) Never done  ANNUAL REVIEW OF HM ORDERS due on 05/03/2025    Please call us [phone] (or use [employer]) to address the above recommendations if needed.    Thank you for choosing [facility].  It was an honor and a privilege to participate in your care.       Healthy regards,    Marnie Parnell, MOHINI  [facility]

## 2025-05-27 ENCOUNTER — OFFICE VISIT (OUTPATIENT)
Dept: FAMILY MEDICINE | Facility: CLINIC | Age: 25
End: 2025-05-27
Payer: COMMERCIAL

## 2025-05-27 VITALS
TEMPERATURE: 97.8 F | HEART RATE: 116 BPM | RESPIRATION RATE: 13 BRPM | SYSTOLIC BLOOD PRESSURE: 112 MMHG | DIASTOLIC BLOOD PRESSURE: 73 MMHG | WEIGHT: 189 LBS | OXYGEN SATURATION: 98 % | HEIGHT: 64 IN | BODY MASS INDEX: 32.27 KG/M2

## 2025-05-27 DIAGNOSIS — L91.8 SKIN TAG: ICD-10-CM

## 2025-05-27 DIAGNOSIS — F33.1 MAJOR DEPRESSIVE DISORDER, RECURRENT EPISODE, MODERATE (H): ICD-10-CM

## 2025-05-27 DIAGNOSIS — L98.9 SKIN LESION: Primary | ICD-10-CM

## 2025-05-27 PROCEDURE — 88305 TISSUE EXAM BY PATHOLOGIST: CPT | Performed by: DERMATOLOGY

## 2025-05-27 NOTE — PROGRESS NOTES
"  Assessment & Plan     Skin lesion  Harry well without incident.   Path pending.   At home skin care.   Sanaz verbalizes understanding of plan of care and is in agreement.    - trunk, arms, legs  - scalp, neck, hands, feet, genitalia, other  - Dermatological Path Order and Indications  - Dermatological Path Order and Indications    Skin tag    - Removal of up to 15 skin tags    Major depressive disorder, recurrent episode, moderate (H)  Stable addressed at last visit.          Return in about 1 week (around 6/3/2025) for if symptoms persist or worsening please be seen.     Subjective   Sanaz is a 25 year old, presenting for the following health issues:  Lesion Removal        5/27/2025     3:11 PM   Additional Questions   Roomed by [facility] CMA   Accompanied by Self     HPI      Mole removals - shave - gluteal cleft and upper/mid back. and skin tag-left armpit removal       Review of Systems  Constitutional, neuro, ENT, endocrine, pulmonary, cardiac, gastrointestinal, genitourinary, musculoskeletal, integument and psychiatric systems are negative, except as otherwise noted.      Objective    /73 (BP Location: Right arm, Patient Position: Chair, Cuff Size: Adult Regular)   Pulse 116   Temp 97.8  F (36.6  C) (Tympanic)   Resp 13   Ht 1.635 m (5' 4.37\")   Wt 85.7 kg (189 lb)   LMP 05/27/2025 (Exact Date)   SpO2 98%   BMI 32.07 kg/m    Body mass index is 32.07 kg/m .  Physical Exam   SKIN: Left lateral chest/armpit skin tag  Middle back - upper raised irregular papule 0.5 x 0.3cm. Gluteal Cleft -raised brown papule with one hair growing out of it  0.6 x 0.4 cm        After informed consent was obtained, using Shur-clens for cleansing and 1% Lidocaine with epinephrine for anesthetic, with sterile technique, shave excision x 2 to the mid upper back and left gluteal cleft and also a snip of skin tag in left armpit was performed. Antibiotic dressing is applied, and wound care instructions provided.  Be alert for any " signs of cutaneous infection. The procedure was well tolerated without complications. Follow up: The specimen is labelled and sent to pathology for evaluation..        Signed Electronically by: TYRONE Gan CNP

## 2025-05-29 LAB
PATH REPORT.COMMENTS IMP SPEC: NORMAL
PATH REPORT.COMMENTS IMP SPEC: NORMAL
PATH REPORT.FINAL DX SPEC: NORMAL
PATH REPORT.GROSS SPEC: NORMAL
PATH REPORT.MICROSCOPIC SPEC OTHER STN: NORMAL
PATH REPORT.RELEVANT HX SPEC: NORMAL

## 2025-05-31 DIAGNOSIS — Z30.41 ENCOUNTER FOR SURVEILLANCE OF CONTRACEPTIVE PILLS: ICD-10-CM

## 2025-06-02 RX ORDER — NORGESTIMATE AND ETHINYL ESTRADIOL 7DAYSX3 LO
1 KIT ORAL DAILY
Qty: 84 TABLET | Refills: 3 | Status: SHIPPED | OUTPATIENT
Start: 2025-06-02

## 2025-06-03 ENCOUNTER — RESULTS FOLLOW-UP (OUTPATIENT)
Dept: FAMILY MEDICINE | Facility: CLINIC | Age: 25
End: 2025-06-03